# Patient Record
Sex: MALE | Race: OTHER | HISPANIC OR LATINO
[De-identification: names, ages, dates, MRNs, and addresses within clinical notes are randomized per-mention and may not be internally consistent; named-entity substitution may affect disease eponyms.]

---

## 2018-12-21 ENCOUNTER — MOBILE ON CALL (OUTPATIENT)
Age: 63
End: 2018-12-21

## 2018-12-28 ENCOUNTER — RX RENEWAL (OUTPATIENT)
Age: 63
End: 2018-12-28

## 2018-12-28 PROBLEM — Z00.00 ENCOUNTER FOR PREVENTIVE HEALTH EXAMINATION: Status: ACTIVE | Noted: 2018-12-28

## 2019-02-20 ENCOUNTER — RECORD ABSTRACTING (OUTPATIENT)
Age: 64
End: 2019-02-20

## 2019-02-20 DIAGNOSIS — Z78.9 OTHER SPECIFIED HEALTH STATUS: ICD-10-CM

## 2019-02-20 DIAGNOSIS — Z86.69 PERSONAL HISTORY OF OTHER DISEASES OF THE NERVOUS SYSTEM AND SENSE ORGANS: ICD-10-CM

## 2019-02-20 DIAGNOSIS — Z82.49 FAMILY HISTORY OF ISCHEMIC HEART DISEASE AND OTHER DISEASES OF THE CIRCULATORY SYSTEM: ICD-10-CM

## 2019-03-11 PROBLEM — Z82.49 FAMILY HISTORY OF MYOCARDIAL INFARCTION: Status: ACTIVE | Noted: 2019-03-11

## 2019-04-10 ENCOUNTER — NON-APPOINTMENT (OUTPATIENT)
Age: 64
End: 2019-04-10

## 2019-04-10 ENCOUNTER — APPOINTMENT (OUTPATIENT)
Dept: CARDIOLOGY | Facility: CLINIC | Age: 64
End: 2019-04-10
Payer: COMMERCIAL

## 2019-04-10 VITALS
SYSTOLIC BLOOD PRESSURE: 138 MMHG | HEIGHT: 70 IN | WEIGHT: 255 LBS | HEART RATE: 55 BPM | DIASTOLIC BLOOD PRESSURE: 74 MMHG | BODY MASS INDEX: 36.51 KG/M2

## 2019-04-10 PROCEDURE — 99214 OFFICE O/P EST MOD 30 MIN: CPT

## 2019-04-10 PROCEDURE — 93000 ELECTROCARDIOGRAM COMPLETE: CPT

## 2019-04-10 RX ORDER — NITROGLYCERIN 0.4 MG/1
0.4 TABLET SUBLINGUAL
Refills: 0 | Status: ACTIVE | COMMUNITY

## 2019-04-10 NOTE — HISTORY OF PRESENT ILLNESS
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 ([facility]). He has not been hospitalized in the last 6 months. He has been troubled by some heel pain. He denies chest pain, but has "felt a little funny, like the breath is tight". This occurred when walking from the train. no PND, or orthopnea, palpitations or syncope. He is not exercising but walks 10-15 minutes to work daily.

## 2019-04-12 ENCOUNTER — APPOINTMENT (OUTPATIENT)
Dept: PODIATRY | Facility: CLINIC | Age: 64
End: 2019-04-12
Payer: COMMERCIAL

## 2019-04-12 VITALS
WEIGHT: 255 LBS | BODY MASS INDEX: 36.51 KG/M2 | SYSTOLIC BLOOD PRESSURE: 148 MMHG | DIASTOLIC BLOOD PRESSURE: 80 MMHG | HEIGHT: 70 IN

## 2019-04-12 PROCEDURE — 73630 X-RAY EXAM OF FOOT: CPT | Mod: RT

## 2019-04-12 PROCEDURE — 99203 OFFICE O/P NEW LOW 30 MIN: CPT | Mod: 25

## 2019-04-12 NOTE — PROCEDURE
[FreeTextEntry1] : Evaluation of the posterior portion of the heel reveals pain upon palpation of the area of insertion of the Achilles tendon to the posterior aspect of the heel. 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

## 2019-04-12 NOTE — PHYSICAL EXAM
[General Appearance - Alert] : alert [General Appearance - In No Acute Distress] : in no acute distress [Motor Tone] : muscle strength and tone were normal [] : no rash [Skin Turgor] : normal skin turgor [Skin Color & Pigmentation] : normal skin color and pigmentation [Sensation] : the sensory exam was normal to light touch and pinprick [Deep Tendon Reflexes (DTR)] : deep tendon reflexes were 2+ and symmetric [No Focal Deficits] : no focal deficits [Oriented To Time, Place, And Person] : oriented to person, place, and time [Affect] : the affect was normal [Impaired Insight] : insight and judgment were intact [FreeTextEntry1] : There is a mildly antalgic gait present. There are bilateral had been performed and was appreciated at the posterior heel enlargement. Range of motion at the ankle joint and slightly less than 0° bilateral there is no redness or inflammation appreciated no sign of infection and the Achilles tendon is completely intact without any evidence of void. The patient is able to rise up on his toes bilaterally

## 2019-04-12 NOTE — REVIEW OF SYSTEMS
[As Noted in HPI] : as noted in HPI [Negative] : Psychiatric [Arthralgias] : no arthralgias [Joint Pain] : no joint pain [Joint Stiffness] : no joint stiffness [Joint Swelling] : no joint swelling [Limb Swelling] : no limb swelling [Skin Lesions] : no skin lesions [Limb Pain] : no limb pain [de-identified] : pump bump present bilateral [Skin Wound] : no skin wound

## 2019-04-12 NOTE — ADDENDUM
[FreeTextEntry1] : X-ray evaluation of the right foot and heel reveal no evidence of plantar posterior . 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

## 2019-04-12 NOTE — HISTORY OF PRESENT ILLNESS
[FreeTextEntry1] : Location: posterior aspect\par Duration: about 3 weeks\par Acute: yes\par Chronic:\par Past Tx: nothing\par Exacerbated by: walking \par

## 2019-05-06 ENCOUNTER — RESULT REVIEW (OUTPATIENT)
Age: 64
End: 2019-05-06

## 2019-05-09 ENCOUNTER — OTHER (OUTPATIENT)
Age: 64
End: 2019-05-09

## 2019-05-16 ENCOUNTER — APPOINTMENT (OUTPATIENT)
Dept: PODIATRY | Facility: CLINIC | Age: 64
End: 2019-05-16
Payer: COMMERCIAL

## 2019-05-16 VITALS
SYSTOLIC BLOOD PRESSURE: 140 MMHG | WEIGHT: 255 LBS | BODY MASS INDEX: 36.51 KG/M2 | HEIGHT: 70 IN | DIASTOLIC BLOOD PRESSURE: 80 MMHG

## 2019-05-16 PROCEDURE — 99213 OFFICE O/P EST LOW 20 MIN: CPT

## 2019-05-16 NOTE — PROCEDURE
[FreeTextEntry1] : The patient was advised formal physical therapy is critical at this point and that I recommend it in order to try and achieve best possible outcome to their problem. \par A complete and thorough evaluation of the type of shoes they should be wearing and type of shoes for this time of year was discussed with patient.\par \par \par

## 2019-05-16 NOTE — HISTORY OF PRESENT ILLNESS
[FreeTextEntry1] : Location: posterior aspect\par Acute: yes\par Past Tx: nsaids and heel lifts, didn’t go to PT at all\par

## 2019-05-16 NOTE — PHYSICAL EXAM
[General Appearance - Alert] : alert [General Appearance - In No Acute Distress] : in no acute distress [Full Pulse] : the pedal pulses are present [Edema] : there was no peripheral edema [Motor Tone] : muscle strength and tone were normal [Skin Color & Pigmentation] : normal skin color and pigmentation [Skin Turgor] : normal skin turgor [] : no rash [FreeTextEntry1] : Examination shows less pain upon palpation of the posterior aspect of the heel, little pain at the AT, the patient denies as much pain at the end of the day, the pain they currently experience is more in the form of post static dyskinesia. The patient states prior treatments have significantly improved the condition. No additional signs or symptoms regarding pain noted.

## 2019-05-22 ENCOUNTER — OTHER (OUTPATIENT)
Age: 64
End: 2019-05-22

## 2019-05-31 ENCOUNTER — APPOINTMENT (OUTPATIENT)
Dept: CARDIOLOGY | Facility: CLINIC | Age: 64
End: 2019-05-31

## 2019-09-11 ENCOUNTER — RX RENEWAL (OUTPATIENT)
Age: 64
End: 2019-09-11

## 2019-10-21 ENCOUNTER — LABORATORY RESULT (OUTPATIENT)
Age: 64
End: 2019-10-21

## 2019-10-22 ENCOUNTER — APPOINTMENT (OUTPATIENT)
Dept: CARDIOLOGY | Facility: CLINIC | Age: 64
End: 2019-10-22
Payer: COMMERCIAL

## 2019-10-22 VITALS
WEIGHT: 255 LBS | HEIGHT: 70 IN | DIASTOLIC BLOOD PRESSURE: 76 MMHG | SYSTOLIC BLOOD PRESSURE: 124 MMHG | HEART RATE: 60 BPM | BODY MASS INDEX: 36.51 KG/M2

## 2019-10-22 DIAGNOSIS — M92.61 JUVENILE OSTEOCHONDROSIS OF TARSUS, RIGHT ANKLE: ICD-10-CM

## 2019-10-22 DIAGNOSIS — M79.671 PAIN IN RIGHT FOOT: ICD-10-CM

## 2019-10-22 DIAGNOSIS — M76.61 ACHILLES TENDINITIS, RIGHT LEG: ICD-10-CM

## 2019-10-22 PROCEDURE — 90653 IIV ADJUVANT VACCINE IM: CPT

## 2019-10-22 PROCEDURE — 36415 COLL VENOUS BLD VENIPUNCTURE: CPT

## 2019-10-22 PROCEDURE — 90471 IMMUNIZATION ADMIN: CPT

## 2019-10-22 PROCEDURE — 93000 ELECTROCARDIOGRAM COMPLETE: CPT

## 2019-10-22 PROCEDURE — 99214 OFFICE O/P EST MOD 30 MIN: CPT | Mod: 25

## 2019-10-22 RX ORDER — DICLOFENAC 35 MG/1
35 CAPSULE ORAL
Qty: 60 | Refills: 1 | Status: DISCONTINUED | COMMUNITY
Start: 2019-04-12 | End: 2019-10-22

## 2019-10-22 NOTE — CARDIOLOGY SUMMARY
[No Ischemia] : no Ischemia [___] : [unfilled] [LVEF ___%] : LVEF [unfilled]% [Mild] : mild mitral regurgitation [Enlarged] : enlarged LA size

## 2019-10-22 NOTE — REASON FOR VISIT
[Coronary Artery Disease] : coronary artery disease [Follow-Up - Clinic] : a clinic follow-up of [FreeTextEntry2] : -6 month

## 2019-10-22 NOTE — HISTORY OF PRESENT ILLNESS
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 ([facility]). He has not been hospitalized in the last 6 months.\par He has  chest tightness at times- cannot be more specific about the frequency, when he walks especially with his bag across his chest .\par He denies , dyspnea, palpitations or syncope.\par He has chronic dyspnea on exertion.\par He needs to walk 10 minutes to work daily.

## 2019-10-23 ENCOUNTER — MED ADMIN CHARGE (OUTPATIENT)
Age: 64
End: 2019-10-23

## 2019-10-23 ENCOUNTER — RX RENEWAL (OUTPATIENT)
Age: 64
End: 2019-10-23

## 2019-10-23 LAB
ALBUMIN SERPL ELPH-MCNC: 4.8 G/DL
ALP BLD-CCNC: 65 U/L
ALT SERPL-CCNC: 25 U/L
ANION GAP SERPL CALC-SCNC: 15 MMOL/L
AST SERPL-CCNC: 20 U/L
BASOPHILS # BLD AUTO: 0.03 K/UL
BASOPHILS NFR BLD AUTO: 0.5 %
BILIRUB SERPL-MCNC: 0.5 MG/DL
BUN SERPL-MCNC: 17 MG/DL
CALCIUM SERPL-MCNC: 9.9 MG/DL
CHLORIDE SERPL-SCNC: 105 MMOL/L
CHOLEST SERPL-MCNC: 156 MG/DL
CHOLEST/HDLC SERPL: 3.1 RATIO
CO2 SERPL-SCNC: 26 MMOL/L
CREAT SERPL-MCNC: 0.97 MG/DL
EOSINOPHIL # BLD AUTO: 0.09 K/UL
EOSINOPHIL NFR BLD AUTO: 1.6 %
GLUCOSE SERPL-MCNC: 104 MG/DL
HCT VFR BLD CALC: 47.5 %
HDLC SERPL-MCNC: 51 MG/DL
HGB BLD-MCNC: 15.3 G/DL
IMM GRANULOCYTES NFR BLD AUTO: 0.4 %
LDLC SERPL CALC-MCNC: 85 MG/DL
LYMPHOCYTES # BLD AUTO: 1.34 K/UL
LYMPHOCYTES NFR BLD AUTO: 23.7 %
MAN DIFF?: NORMAL
MCHC RBC-ENTMCNC: 31.7 PG
MCHC RBC-ENTMCNC: 32.2 GM/DL
MCV RBC AUTO: 98.3 FL
MONOCYTES # BLD AUTO: 0.49 K/UL
MONOCYTES NFR BLD AUTO: 8.7 %
NEUTROPHILS # BLD AUTO: 3.69 K/UL
NEUTROPHILS NFR BLD AUTO: 65.1 %
PLATELET # BLD AUTO: 181 K/UL
POTASSIUM SERPL-SCNC: 5.1 MMOL/L
PROT SERPL-MCNC: 6.9 G/DL
RBC # BLD: 4.83 M/UL
RBC # FLD: 12.4 %
SODIUM SERPL-SCNC: 146 MMOL/L
TRIGL SERPL-MCNC: 100 MG/DL
WBC # FLD AUTO: 5.66 K/UL

## 2019-11-06 ENCOUNTER — MOBILE ON CALL (OUTPATIENT)
Age: 64
End: 2019-11-06

## 2019-11-07 ENCOUNTER — OTHER (OUTPATIENT)
Age: 64
End: 2019-11-07

## 2019-11-08 VITALS
SYSTOLIC BLOOD PRESSURE: 117 MMHG | TEMPERATURE: 97 F | RESPIRATION RATE: 18 BRPM | OXYGEN SATURATION: 97 % | WEIGHT: 250 LBS | HEIGHT: 70 IN | HEART RATE: 64 BPM | DIASTOLIC BLOOD PRESSURE: 82 MMHG

## 2019-11-08 NOTE — H&P ADULT - HISTORY OF PRESENT ILLNESS
SKELETON    65 yo male, PMHx HTN, Hyperlipidemia, CAD h/o MI in 2003 at [facility] with RCA stent presents to cardiologist, Dr Aguilar, endorsing chest pain when he walks. Denies dyspnea, palpitations, syncope.     CTA coronaries on 11/1/19 LM >50%. Prox/mid LAD severe stenosis. Mid RCA immediately distal to stent with severe stenosis. Distal RCA with severe stenosis. Mid RCA stent unable to assess for ISR. Right dominant system. LCX and OM1 mild luminal narrowing. Confirm Imdur and Zetia doses with patient on arrival    65 yo male, FHX CAD PMHx HTN, Hyperlipidemia, CAD h/o MI in 2003 at [facility] with RCA stent presents to cardiologist, Dr Aguilar, endorsing mild intermittent chest pain with moderate exertion, worsening over the last year. Pt also endorses intermittent episodes of dizziness that are unrelated to the chest pain. Denies syncope, SOB, nausea, orthopnea, LE edema, claudication. CTA coronaries on 11/1/19 LM >50%. Prox/mid LAD severe stenosis. Mid RCA immediately distal to stent with severe stenosis. Distal RCA with severe stenosis. Mid RCA stent unable to assess for ISR. Right dominant system. LCX and OM1 mild luminal narrowing.     In light of pt's risk factors, CCS Class 2 anginal symptoms, and abnormal CTA coronaries pt presents for recommended cardiac catheterization with possible intervention. 65 yo male, FHX CAD PMHx HTN, Hyperlipidemia, CAD h/o MI in 2003 at [facility] with RCA stent presented to cardiologist, Dr Aguilar, endorsing mild intermittent chest pain with moderate exertion, worsening over the last year. Pt also endorses intermittent episodes of dizziness that are unrelated to the chest pain. Denies syncope, SOB, nausea, orthopnea, LE edema, claudication. CTA coronaries on 11/1/19 revealed LM >50%. Prox/mid LAD severe stenosis. Mid RCA immediately distal to stent with severe stenosis. Distal RCA with severe stenosis. Mid RCA stent unable to assess for ISR. Right dominant system. LCX and OM1 mild luminal narrowing.  Nuclear Stress Test (5/9/2019): Did not have chest pain but had RHOADES during exercise and hypertensive response. VPCs including couplets during stress and recovery. Normal Heart rate recovery. Perfusion imaging is consistent with prior interolateral MI with moderate alexandre-infarct ischemia. In addition there mild TI therefore cannot rule out multivessel disease. Reduced thickening of the inferolateral wall, EF 53%.     In light of pt's risk factors, CCS Class 2 anginal symptoms, abnormal CTA coronaries and abnormal NST, pt presents for recommended cardiac catheterization with possible intervention.

## 2019-11-08 NOTE — H&P ADULT - ASSESSMENT
63 yo male, FHX CAD PMHx HTN, Hyperlipidemia, CAD h/o MI in 2003 at [facility] with RCA stent presents for cardiac catheterization with possible intervention if clinically indicated due to CCS Angina Class II Symptoms, patient’s risk factors, abnormal CTA Coronaries and abnormal NST.     -Patient took ASA 81 mg this AM  -NS @ 75 cc/hour pre cath fluids ordered.     Risks & benefits of procedure and alternative therapy have been explained to the patient including but not limited to: allergic reaction, bleeding w/possible need for blood transfusion, infection, renal and vascular compromise, limb damage, arrhythmia, stroke, vessel dissection/perforation, Myocardial infarction, emergent CABG. Informed consent obtained and in chart. 65 yo male, FHX CAD PMHx HTN, Hyperlipidemia, CAD h/o MI in 2003 at [facility] with RCA stent presents for cardiac catheterization with possible intervention if clinically indicated due to CCS Angina Class II Symptoms, patient’s risk factors, abnormal CTA Coronaries and abnormal NST.     -Patient took ASA 81 mg this AM. No Plavix load per Dr. Sharp due to concern for multivessel disease.   -NS @ 75 cc/hour pre cath fluids ordered.     Risks & benefits of procedure and alternative therapy have been explained to the patient including but not limited to: allergic reaction, bleeding w/possible need for blood transfusion, infection, renal and vascular compromise, limb damage, arrhythmia, stroke, vessel dissection/perforation, Myocardial infarction, emergent CABG. Informed consent obtained and in chart.

## 2019-11-08 NOTE — H&P ADULT - NEGATIVE CARDIOVASCULAR SYMPTOMS
no dyspnea on exertion/no claudication/no paroxysmal nocturnal dyspnea/no orthopnea/no peripheral edema

## 2019-11-11 ENCOUNTER — TRANSCRIPTION ENCOUNTER (OUTPATIENT)
Age: 64
End: 2019-11-11

## 2019-11-11 ENCOUNTER — INPATIENT (INPATIENT)
Facility: HOSPITAL | Age: 64
LOS: 0 days | Discharge: HOME | DRG: 247 | End: 2019-11-12
Attending: INTERNAL MEDICINE | Admitting: INTERNAL MEDICINE
Payer: COMMERCIAL

## 2019-11-11 LAB
ALBUMIN SERPL ELPH-MCNC: 4.5 G/DL — SIGNIFICANT CHANGE UP (ref 3.3–5)
ALP SERPL-CCNC: 65 U/L — SIGNIFICANT CHANGE UP (ref 40–120)
ALT FLD-CCNC: 23 U/L — SIGNIFICANT CHANGE UP (ref 10–45)
ANION GAP SERPL CALC-SCNC: 14 MMOL/L — SIGNIFICANT CHANGE UP (ref 5–17)
APTT BLD: 36.8 SEC — HIGH (ref 27.5–36.3)
AST SERPL-CCNC: 21 U/L — SIGNIFICANT CHANGE UP (ref 10–40)
BASOPHILS # BLD AUTO: 0.02 K/UL — SIGNIFICANT CHANGE UP (ref 0–0.2)
BASOPHILS NFR BLD AUTO: 0.3 % — SIGNIFICANT CHANGE UP (ref 0–2)
BILIRUB SERPL-MCNC: 0.7 MG/DL — SIGNIFICANT CHANGE UP (ref 0.2–1.2)
BUN SERPL-MCNC: 16 MG/DL — SIGNIFICANT CHANGE UP (ref 7–23)
CALCIUM SERPL-MCNC: 9.6 MG/DL — SIGNIFICANT CHANGE UP (ref 8.4–10.5)
CHLORIDE SERPL-SCNC: 104 MMOL/L — SIGNIFICANT CHANGE UP (ref 96–108)
CHOLEST SERPL-MCNC: 111 MG/DL — SIGNIFICANT CHANGE UP (ref 10–199)
CK MB CFR SERPL CALC: 3.4 NG/ML — SIGNIFICANT CHANGE UP (ref 0–6.7)
CK SERPL-CCNC: 231 U/L — HIGH (ref 30–200)
CO2 SERPL-SCNC: 24 MMOL/L — SIGNIFICANT CHANGE UP (ref 22–31)
CREAT SERPL-MCNC: 0.94 MG/DL — SIGNIFICANT CHANGE UP (ref 0.5–1.3)
EOSINOPHIL # BLD AUTO: 0.07 K/UL — SIGNIFICANT CHANGE UP (ref 0–0.5)
EOSINOPHIL NFR BLD AUTO: 1 % — SIGNIFICANT CHANGE UP (ref 0–6)
GLUCOSE SERPL-MCNC: 109 MG/DL — HIGH (ref 70–99)
HBA1C BLD-MCNC: 5.7 % — HIGH (ref 4–5.6)
HCT VFR BLD CALC: 42.9 % — SIGNIFICANT CHANGE UP (ref 39–50)
HDLC SERPL-MCNC: 44 MG/DL — SIGNIFICANT CHANGE UP
HGB BLD-MCNC: 14.8 G/DL — SIGNIFICANT CHANGE UP (ref 13–17)
IMM GRANULOCYTES NFR BLD AUTO: 0.3 % — SIGNIFICANT CHANGE UP (ref 0–1.5)
INR BLD: 1.04 — SIGNIFICANT CHANGE UP (ref 0.88–1.16)
LIPID PNL WITH DIRECT LDL SERPL: 51 MG/DL — SIGNIFICANT CHANGE UP
LYMPHOCYTES # BLD AUTO: 1.35 K/UL — SIGNIFICANT CHANGE UP (ref 1–3.3)
LYMPHOCYTES # BLD AUTO: 18.9 % — SIGNIFICANT CHANGE UP (ref 13–44)
MCHC RBC-ENTMCNC: 31.9 PG — SIGNIFICANT CHANGE UP (ref 27–34)
MCHC RBC-ENTMCNC: 34.5 GM/DL — SIGNIFICANT CHANGE UP (ref 32–36)
MCV RBC AUTO: 92.5 FL — SIGNIFICANT CHANGE UP (ref 80–100)
MONOCYTES # BLD AUTO: 0.69 K/UL — SIGNIFICANT CHANGE UP (ref 0–0.9)
MONOCYTES NFR BLD AUTO: 9.6 % — SIGNIFICANT CHANGE UP (ref 2–14)
NEUTROPHILS # BLD AUTO: 5.01 K/UL — SIGNIFICANT CHANGE UP (ref 1.8–7.4)
NEUTROPHILS NFR BLD AUTO: 69.9 % — SIGNIFICANT CHANGE UP (ref 43–77)
NRBC # BLD: 0 /100 WBCS — SIGNIFICANT CHANGE UP (ref 0–0)
PLATELET # BLD AUTO: 179 K/UL — SIGNIFICANT CHANGE UP (ref 150–400)
POTASSIUM SERPL-MCNC: 4.2 MMOL/L — SIGNIFICANT CHANGE UP (ref 3.5–5.3)
POTASSIUM SERPL-SCNC: 4.2 MMOL/L — SIGNIFICANT CHANGE UP (ref 3.5–5.3)
PROT SERPL-MCNC: 7.6 G/DL — SIGNIFICANT CHANGE UP (ref 6–8.3)
PROTHROM AB SERPL-ACNC: 11.8 SEC — SIGNIFICANT CHANGE UP (ref 10–12.9)
RBC # BLD: 4.64 M/UL — SIGNIFICANT CHANGE UP (ref 4.2–5.8)
RBC # FLD: 11.9 % — SIGNIFICANT CHANGE UP (ref 10.3–14.5)
SODIUM SERPL-SCNC: 142 MMOL/L — SIGNIFICANT CHANGE UP (ref 135–145)
TOTAL CHOLESTEROL/HDL RATIO MEASUREMENT: 2.5 RATIO — LOW (ref 3.4–9.6)
TRIGL SERPL-MCNC: 80 MG/DL — SIGNIFICANT CHANGE UP (ref 10–149)
WBC # BLD: 7.16 K/UL — SIGNIFICANT CHANGE UP (ref 3.8–10.5)
WBC # FLD AUTO: 7.16 K/UL — SIGNIFICANT CHANGE UP (ref 3.8–10.5)

## 2019-11-11 PROCEDURE — 93571 IV DOP VEL&/PRESS C FLO 1ST: CPT | Mod: 26,LD

## 2019-11-11 PROCEDURE — 92928 PRQ TCAT PLMT NTRAC ST 1 LES: CPT | Mod: LD

## 2019-11-11 PROCEDURE — 93306 TTE W/DOPPLER COMPLETE: CPT | Mod: 26

## 2019-11-11 PROCEDURE — 99291 CRITICAL CARE FIRST HOUR: CPT

## 2019-11-11 PROCEDURE — 93572 IV DOP VEL&/PRESS C FLO EA: CPT | Mod: 26,RC

## 2019-11-11 PROCEDURE — 93010 ELECTROCARDIOGRAM REPORT: CPT

## 2019-11-11 PROCEDURE — 93458 L HRT ARTERY/VENTRICLE ANGIO: CPT | Mod: 26,59

## 2019-11-11 RX ORDER — ENOXAPARIN SODIUM 100 MG/ML
40 INJECTION SUBCUTANEOUS EVERY 24 HOURS
Refills: 0 | Status: DISCONTINUED | OUTPATIENT
Start: 2019-11-11 | End: 2019-11-12

## 2019-11-11 RX ORDER — CLOPIDOGREL BISULFATE 75 MG/1
75 TABLET, FILM COATED ORAL DAILY
Refills: 0 | Status: DISCONTINUED | OUTPATIENT
Start: 2019-11-12 | End: 2019-11-12

## 2019-11-11 RX ORDER — ISOSORBIDE MONONITRATE 60 MG/1
30 TABLET, EXTENDED RELEASE ORAL DAILY
Refills: 0 | Status: DISCONTINUED | OUTPATIENT
Start: 2019-11-11 | End: 2019-11-12

## 2019-11-11 RX ORDER — ATORVASTATIN CALCIUM 80 MG/1
80 TABLET, FILM COATED ORAL DAILY
Refills: 0 | Status: DISCONTINUED | OUTPATIENT
Start: 2019-11-11 | End: 2019-11-12

## 2019-11-11 RX ORDER — ONDANSETRON 8 MG/1
4 TABLET, FILM COATED ORAL ONCE
Refills: 0 | Status: COMPLETED | OUTPATIENT
Start: 2019-11-11 | End: 2019-11-11

## 2019-11-11 RX ORDER — LISINOPRIL 2.5 MG/1
40 TABLET ORAL DAILY
Refills: 0 | Status: DISCONTINUED | OUTPATIENT
Start: 2019-11-12 | End: 2019-11-12

## 2019-11-11 RX ORDER — SODIUM CHLORIDE 9 MG/ML
500 INJECTION INTRAMUSCULAR; INTRAVENOUS; SUBCUTANEOUS ONCE
Refills: 0 | Status: COMPLETED | OUTPATIENT
Start: 2019-11-11 | End: 2019-11-11

## 2019-11-11 RX ORDER — SODIUM CHLORIDE 9 MG/ML
500 INJECTION INTRAMUSCULAR; INTRAVENOUS; SUBCUTANEOUS
Refills: 0 | Status: DISCONTINUED | OUTPATIENT
Start: 2019-11-11 | End: 2019-11-11

## 2019-11-11 RX ORDER — AMLODIPINE BESYLATE 2.5 MG/1
5 TABLET ORAL DAILY
Refills: 0 | Status: DISCONTINUED | OUTPATIENT
Start: 2019-11-11 | End: 2019-11-12

## 2019-11-11 RX ORDER — CHLORHEXIDINE GLUCONATE 213 G/1000ML
1 SOLUTION TOPICAL
Refills: 0 | Status: DISCONTINUED | OUTPATIENT
Start: 2019-11-11 | End: 2019-11-12

## 2019-11-11 RX ORDER — ASPIRIN/CALCIUM CARB/MAGNESIUM 324 MG
81 TABLET ORAL DAILY
Refills: 0 | Status: DISCONTINUED | OUTPATIENT
Start: 2019-11-12 | End: 2019-11-12

## 2019-11-11 RX ADMIN — SODIUM CHLORIDE 1801.8 MILLILITER(S): 9 INJECTION INTRAMUSCULAR; INTRAVENOUS; SUBCUTANEOUS at 12:23

## 2019-11-11 RX ADMIN — ENOXAPARIN SODIUM 40 MILLIGRAM(S): 100 INJECTION SUBCUTANEOUS at 18:22

## 2019-11-11 RX ADMIN — ONDANSETRON 4 MILLIGRAM(S): 8 TABLET, FILM COATED ORAL at 11:56

## 2019-11-11 RX ADMIN — SODIUM CHLORIDE 75 MILLILITER(S): 9 INJECTION INTRAMUSCULAR; INTRAVENOUS; SUBCUTANEOUS at 08:00

## 2019-11-11 NOTE — PROGRESS NOTE ADULT - ATTENDING COMMENTS
65 y/o man with a PMHx of CAD s/p RCA stent at [facility], MI in 2003 at [facility], HTN, HLD  who presented for recommended cardiac catheterization with possible intervention due to CCS Class 2 anginal symptoms, abnormal CTA coronaries and abnormal NST. Pt s/p cardiac cath on 11/11 FFR/rotational artherectomy/PTCA/NAKITA mLAD; EF 50%;  Complicated by an episode of bradycardia s/p 1x 0.5mg IV atropine and 1L NS and an episode of SOB and nausea. BP 93/60, HR 61; EKG without changes. Echo showing no R side collapse, mod-moderate MR. Stepped up to CCU for close monitoring.    CARDIOVASCULAR  #CAD  - with hx of MI in 2003 at [facility], s/p RCA stent  - sees Cardiologist Dr. Aguilar, with mild intermittent CP with moderate exertion that progressively worsened in the past year  - 11/1/19 CTA coronaries showed LM>50%, p/mid-LAD with severe stenosis; mid-RCA immediately distal to stent with severe stenosis. Distal RCA with severe stenosis. Mid RCA stent unable to assess for ISR. Right dominant system. LCX and OM1 mild luminal narrowing.  - 5/9/19 Nuclear stress test: Did not have chest pain but had RHOADES during exercise and hypertensive response. VPCs including couplets during stress and recovery. Normal Heart rate recovery. Perfusion imaging is consistent with prior interolateral MI with moderate alexandre-infarct ischemia. In addition there mild TI therefore cannot rule out multivessel disease. Reduced thickening of the inferolateral wall, EF 53%.   - in light of pt's risk factors, CCS Class 2 anginal symptoms, abnormal CTA coronaries and abnormal NST, pt presents for recommended cardiac catheterization with possible intervention  - cardiac cath on 11/11 FFR/rotational artherectomy/PTCA/NAKITA mLAD; EF 50%; RCA patent, significant Lmain with LAD disease, RCA with FFR>0.84  - pt loaded with ASA and plavix in cath lab  - pt with mild SOB that improves with position, now self-resolved  - transferred to CCU for further monitoring  - c/w ASA 81mg qd, plavix 75mg, and atorvastatin 80mg qd    #Bradycardia  - Pt had an episode of bradycardia with 2-3 sec pauses prior to atherectomy, resolved s/p IV 0.5mg atropine x1  - continue to monitor on tele    #HTN  Pt with hx of HTN  - BP normotensive here with sBP 110s-120s  - c/w lisinopril 40mg, amlodipine 5mg, imdur 30mg qd    #HLD  Pt has hx of HLD  - c/w atorvastatin 80mg po qd    PULMONARY  #SOB  - pt had an episode of SOB that improves with sitting up and laying down  - BP 93/60, HR 61; EKG without changes  - Echo showing no R side collapse, mod-moderate MR  - now self resolved  - continue to monitor in the CCU        I have personally provided 30 minutes of critical care time concurrently with the resident and  fellow.  I have reviewed the resident’s documentation and I agree with the resident’s assessment and plan of care.  EULALIO Santiago.

## 2019-11-11 NOTE — PROGRESS NOTE ADULT - ASSESSMENT
65 y/o man with a PMHx of CAD s/p RCA stent at [place], [place] in 2003 at [facility], HTN, HLD  who presented for recommended cardiac catheterization with possible intervention due to CCS Class 2 anginal symptoms, abnormal CTA coronaries and abnormal NST. Pt s/p cardiac cath on 11/11 FFR/rotational artherectomy/PTCA/NAKITA mLAD; EF 50%;  Complicated by an episode of bradycardia s/p 1x 0.5mg IV atropine and 1L NS and an episode of SOB and nausea. BP 93/60, HR 61; EKG without changes. STAT Echo showing no R side collapse, mod-moderate MR. Stepped up to CCU for close monitoring.    CARDIOVASCULAR  #CAD    - c/w ASA 81mg qd, plavix 75mg, and atorvastatin 80mg qd    #HTN  Pt with hx of HTN  - c/w lisinopril 40mg po qd and amlodipine 5mg po qd    #HLD  Pt has hx of HLD  - c/w atorvastatin 80mg po qd 65 y/o man with a PMHx of CAD s/p RCA stent at [facility], [place] in 2003 at [facility], HTN, HLD  who presented for recommended cardiac catheterization with possible intervention due to CCS Class 2 anginal symptoms, abnormal CTA coronaries and abnormal NST. Pt s/p cardiac cath on 11/11 FFR/rotational artherectomy/PTCA/NAKITA mLAD; EF 50%;  Complicated by an episode of bradycardia s/p 1x 0.5mg IV atropine and 1L NS and an episode of SOB and nausea. BP 93/60, HR 61; EKG without changes. STAT Echo showing no R side collapse, mod-moderate MR. Stepped up to CCU for close monitoring.    CARDIOVASCULAR  #CAD  - with hx of MI in 2003 at [facility], s/p RCA stent  - sees Cardiologist Dr. Aguilar, with mild intermittent CP with moderate exertion that progressively worsened in the past year  - 11/1/19 CTA coronaries showed LM>50%, p/mid-LAD with severe stenosis; mid-RCA immediately distal to stent with severe stenosis. Distal RCA with severe stenosis. Mid RCA stent unable to assess for ISR. Right dominant system. LCX and OM1 mild luminal narrowing.  - 5/9/19 Nuclear stress test: Did not have chest pain but had RHOADES during exercise and hypertensive response. VPCs including couplets during stress and recovery. Normal Heart rate recovery. Perfusion imaging is consistent with prior interolateral MI with moderate alexandre-infarct ischemia. In addition there mild TI therefore cannot rule out multivessel disease. Reduced thickening of the inferolateral wall, EF 53%.   - in light of pt's risk factors, CCS Class 2 anginal symptoms, abnormal CTA coronaries and abnormal NST, pt presents for recommended cardiac catheterization with possible intervention  - cardiac cath on 11/11 FFR/rotational artherectomy/PTCA/NAKITA mLAD; EF 50%; RCA patent, significant Lmain with LAD disease, RCA with FFR>0.84  - pt loaded with ASA and plavix in cath lab  - pt with mild SOB that changes with position that self-resolved  - transferred to CCU for further monitoring  - c/w ASA 81mg qd, plavix 75mg, and atorvastatin 80mg qd    #Bradycardia  - Pt had an episode of bradycardia with 2-3 sec pauses prior to atherectomy, resolved after IV 0.5mg atropine x1  - continue to monitor on tele    #HTN  Pt with hx of HTN  - BP normotensive here  - c/w lisinopril 40mg po qd and amlodipine 5mg po qd    #HLD  Pt has hx of HLD  - c/w atorvastatin 80mg po qd 65 y/o man with a PMHx of CAD s/p RCA stent at [facility], [place] in 2003 at [facility], HTN, HLD  who presented for recommended cardiac catheterization with possible intervention due to CCS Class 2 anginal symptoms, abnormal CTA coronaries and abnormal NST. Pt s/p cardiac cath on 11/11 FFR/rotational artherectomy/PTCA/NAKITA mLAD; EF 50%;  Complicated by an episode of bradycardia s/p 1x 0.5mg IV atropine and 1L NS and an episode of SOB and nausea. BP 93/60, HR 61; EKG without changes. Bedside echo showing no R side collapse, mod-moderate MR. Stepped up to CCU for close monitoring.    CARDIOVASCULAR  #CAD  - with hx of MI in 2003 at [facility], s/p RCA stent  - sees Cardiologist Dr. Aguilar, with mild intermittent CP with moderate exertion that progressively worsened in the past year  - 11/1/19 CTA coronaries showed LM>50%, p/mid-LAD with severe stenosis; mid-RCA immediately distal to stent with severe stenosis. Distal RCA with severe stenosis. Mid RCA stent unable to assess for ISR. Right dominant system. LCX and OM1 mild luminal narrowing.  - 5/9/19 Nuclear stress test: Did not have chest pain but had RHOADES during exercise and hypertensive response. VPCs including couplets during stress and recovery. Normal Heart rate recovery. Perfusion imaging is consistent with prior interolateral MI with moderate alexandre-infarct ischemia. In addition there mild TI therefore cannot rule out multivessel disease. Reduced thickening of the inferolateral wall, EF 53%.   - in light of pt's risk factors, CCS Class 2 anginal symptoms, abnormal CTA coronaries and abnormal NST, pt presents for recommended cardiac catheterization with possible intervention  - cardiac cath on 11/11 FFR/rotational artherectomy/PTCA/NAKITA mLAD; EF 50%; RCA patent, significant Lmain with LAD disease, RCA with FFR>0.84  - pt loaded with ASA and plavix in cath lab  - pt with mild SOB that changes with position that self-resolved  - transferred to CCU for further monitoring  - c/w ASA 81mg qd, plavix 75mg, and atorvastatin 80mg qd    #Bradycardia  - Pt had an episode of bradycardia with 2-3 sec pauses prior to atherectomy, resolved s/p IV 0.5mg atropine x1  - continue to monitor on tele    #HTN  Pt with hx of HTN  - BP normotensive here with sBP 110s-120s  - c/w lisinopril 40mg, amlodipine 5mg, imdur 30mg qd    #HLD  Pt has hx of HLD  - c/w atorvastatin 80mg po qd    PULMONARY  #SOB  - pt had an episode of SOB that improves with sitting up and laying down  - BP 93/60, HR 61; EKG without changes  - Bedside showing no R side collapse, mod-moderate MR  - self resolved  - continue to monitor in the CCU    Prophylaxis  F: none  E: replete K<4, Mg<2  N: DASH/TLC  VTE Prophylaxis: hep subQ, SCDs  C: Full Code  D: CCU 63 y/o man with a PMHx of CAD s/p RCA stent at [facility], [place] in 2003 at [facility], HTN, HLD  who presented for recommended cardiac catheterization with possible intervention due to CCS Class 2 anginal symptoms, abnormal CTA coronaries and abnormal NST. Pt s/p cardiac cath on 11/11 FFR/rotational artherectomy/PTCA/NAKITA mLAD; EF 50%;  Complicated by an episode of bradycardia s/p 1x 0.5mg IV atropine and 1L NS and an episode of SOB and nausea. BP 93/60, HR 61; EKG without changes. Echo showing no R side collapse, mod-moderate MR. Stepped up to CCU for close monitoring.    CARDIOVASCULAR  #CAD  - with hx of MI in 2003 at [facility], s/p RCA stent  - sees Cardiologist Dr. Aguilar, with mild intermittent CP with moderate exertion that progressively worsened in the past year  - 11/1/19 CTA coronaries showed LM>50%, p/mid-LAD with severe stenosis; mid-RCA immediately distal to stent with severe stenosis. Distal RCA with severe stenosis. Mid RCA stent unable to assess for ISR. Right dominant system. LCX and OM1 mild luminal narrowing.  - 5/9/19 Nuclear stress test: Did not have chest pain but had RHOADES during exercise and hypertensive response. VPCs including couplets during stress and recovery. Normal Heart rate recovery. Perfusion imaging is consistent with prior interolateral MI with moderate alexandre-infarct ischemia. In addition there mild TI therefore cannot rule out multivessel disease. Reduced thickening of the inferolateral wall, EF 53%.   - in light of pt's risk factors, CCS Class 2 anginal symptoms, abnormal CTA coronaries and abnormal NST, pt presents for recommended cardiac catheterization with possible intervention  - cardiac cath on 11/11 FFR/rotational artherectomy/PTCA/NAKITA mLAD; EF 50%; RCA patent, significant Lmain with LAD disease, RCA with FFR>0.84  - pt loaded with ASA and plavix in cath lab  - pt with mild SOB that changes with position that self-resolved  - transferred to CCU for further monitoring  - c/w ASA 81mg qd, plavix 75mg, and atorvastatin 80mg qd    #Bradycardia  - Pt had an episode of bradycardia with 2-3 sec pauses prior to atherectomy, resolved s/p IV 0.5mg atropine x1  - continue to monitor on tele    #HTN  Pt with hx of HTN  - BP normotensive here with sBP 110s-120s  - c/w lisinopril 40mg, amlodipine 5mg, imdur 30mg qd    #HLD  Pt has hx of HLD  - c/w atorvastatin 80mg po qd    PULMONARY  #SOB  - pt had an episode of SOB that improves with sitting up and laying down  - BP 93/60, HR 61; EKG without changes  - Echo showing no R side collapse, mod-moderate MR  - self resolved  - continue to monitor in the CCU    Prophylaxis  F: none  E: replete K<4, Mg<2  N: DASH/TLC  VTE Prophylaxis: hep subQ, SCDs  C: Full Code  D: CCU 65 y/o man with a PMHx of CAD s/p RCA stent at [facility], [place] in 2003 at [facility], HTN, HLD  who presented for recommended cardiac catheterization with possible intervention due to CCS Class 2 anginal symptoms, abnormal CTA coronaries and abnormal NST. Pt s/p cardiac cath on 11/11 FFR/rotational artherectomy/PTCA/NAKITA mLAD; EF 50%;  Complicated by an episode of bradycardia s/p 1x 0.5mg IV atropine and 1L NS and an episode of SOB and nausea. BP 93/60, HR 61; EKG without changes. Echo showing no R side collapse, mod-moderate MR. Stepped up to CCU for close monitoring.    CARDIOVASCULAR  #CAD  - with hx of MI in 2003 at [facility], s/p RCA stent  - sees Cardiologist Dr. Aguilar, with mild intermittent CP with moderate exertion that progressively worsened in the past year  - 11/1/19 CTA coronaries showed LM>50%, p/mid-LAD with severe stenosis; mid-RCA immediately distal to stent with severe stenosis. Distal RCA with severe stenosis. Mid RCA stent unable to assess for ISR. Right dominant system. LCX and OM1 mild luminal narrowing.  - 5/9/19 Nuclear stress test: Did not have chest pain but had RHOADES during exercise and hypertensive response. VPCs including couplets during stress and recovery. Normal Heart rate recovery. Perfusion imaging is consistent with prior interolateral MI with moderate alexandre-infarct ischemia. In addition there mild TI therefore cannot rule out multivessel disease. Reduced thickening of the inferolateral wall, EF 53%.   - in light of pt's risk factors, CCS Class 2 anginal symptoms, abnormal CTA coronaries and abnormal NST, pt presents for recommended cardiac catheterization with possible intervention  - cardiac cath on 11/11 FFR/rotational artherectomy/PTCA/NAKITA mLAD; EF 50%; RCA patent, significant Lmain with LAD disease, RCA with FFR>0.84  - pt loaded with ASA and plavix in cath lab  - pt with mild SOB that improves with position, now self-resolved  - transferred to CCU for further monitoring  - c/w ASA 81mg qd, plavix 75mg, and atorvastatin 80mg qd    #Bradycardia  - Pt had an episode of bradycardia with 2-3 sec pauses prior to atherectomy, resolved s/p IV 0.5mg atropine x1  - continue to monitor on tele    #HTN  Pt with hx of HTN  - BP normotensive here with sBP 110s-120s  - c/w lisinopril 40mg, amlodipine 5mg, imdur 30mg qd    #HLD  Pt has hx of HLD  - c/w atorvastatin 80mg po qd    PULMONARY  #SOB  - pt had an episode of SOB that improves with sitting up and laying down  - BP 93/60, HR 61; EKG without changes  - Echo showing no R side collapse, mod-moderate MR  - now self resolved  - continue to monitor in the CCU    Prophylaxis  F: none  E: replete K<4, Mg<2  N: DASH/TLC  VTE Prophylaxis: lovenox subQ, SCDs  C: Full Code  D: CCU 63 y/o man with a PMHx of CAD s/p RCA stent at [facility], [place] in 2003 at [facility], HTN, HLD who presented for recommended cardiac catheterization with possible intervention due to CCS Class 2 anginal symptoms, abnormal CTA coronaries and abnormal NST. Pt s/p cardiac cath on 11/11 FFR/rotational artherectomy/PTCA/NAKITA mLAD; EF 50%;  Complicated by an episode of bradycardia s/p 1x 0.5mg IV atropine and 1L NS and an episode of SOB and nausea. BP 93/60, HR 61; EKG without changes. Echo showing no R side collapse, mod-moderate MR. Stepped up to CCU for close monitoring.    CARDIOVASCULAR  #CAD  - with hx of MI in 2003 at [facility], s/p RCA stent  - sees Cardiologist Dr. Aguilar, with mild intermittent CP with moderate exertion that progressively worsened in the past year  - 11/1/19 CTA coronaries showed LM>50%, p/mid-LAD with severe stenosis; mid-RCA immediately distal to stent with severe stenosis. Distal RCA with severe stenosis. Mid RCA stent unable to assess for ISR. Right dominant system. LCX and OM1 mild luminal narrowing.  - 5/9/19 Nuclear stress test: Did not have chest pain but had RHOADES during exercise and hypertensive response. VPCs including couplets during stress and recovery. Normal Heart rate recovery. Perfusion imaging is consistent with prior interolateral MI with moderate alexandre-infarct ischemia. In addition there mild TI therefore cannot rule out multivessel disease. Reduced thickening of the inferolateral wall, EF 53%.   - in light of pt's risk factors, CCS Class 2 anginal symptoms, abnormal CTA coronaries and abnormal NST, pt presents for recommended cardiac catheterization with possible intervention  - cardiac cath on 11/11 FFR/rotational artherectomy/PTCA/NAKITA mLAD; EF 50%; RCA patent, significant Lmain with LAD disease, RCA with FFR>0.84  - pt loaded with ASA and plavix in cath lab  - pt with mild SOB that improves with position, now self-resolved  - transferred to CCU for further monitoring  - c/w ASA 81mg qd, plavix 75mg, and atorvastatin 80mg qd    #Bradycardia  - Pt had an episode of bradycardia with 2-3 sec pauses prior to atherectomy, resolved s/p IV 0.5mg atropine x1  - continue to monitor on tele    #HTN  Pt with hx of HTN  - BP normotensive here with sBP 110s-120s  - c/w lisinopril 40mg, amlodipine 5mg, imdur 30mg qd    #HLD  Pt has hx of HLD  - c/w atorvastatin 80mg po qd    PULMONARY  #SOB  - pt had an episode of SOB that improves with sitting up and laying down  - BP 93/60, HR 61; EKG without changes  - Echo showing no R side collapse, mod-moderate MR  - now self resolved  - continue to monitor in the CCU    Prophylaxis  F: none  E: replete K<4, Mg<2  N: DASH/TLC  VTE Prophylaxis: lovenox subQ, SCDs  C: Full Code  D: CCU

## 2019-11-11 NOTE — PATIENT PROFILE ADULT - NSPROEDAREADYLEARN_GEN_A_NUR
- Pain control: now off Dilaudid PCA. Continue Valium ATC, Toradol as needed, Oxycodone as needed.  - IVF, regular diet.  - Bowel regimen: Senna, colace PRN - consider changing to standing or adding Miralax if still not stooling.  - Urine output: s/p straight cath this morning, continue to monitor UOP closely.  - PT - Pain control: now off Dilaudid PCA. Continue Valium ATC, Toradol as needed, Oxycodone as needed.  - IVF, regular diet.  - Bowel regimen: Senna, colace PRN - consider changing to standing or adding Miralax if still not stooling.  - Urine output: s/p straight cath this morning, continue to monitor UOP closely. should repeat cath if no void in 6-8 hrs   - PT none

## 2019-11-11 NOTE — PROGRESS NOTE ADULT - SUBJECTIVE AND OBJECTIVE BOX
11UR TO 5EA TRANSFER ACCEPTANCE NOTE    HOSPITAL COURSE:  63 y/o man with a PMHx of CAD s/p RCA stent at [place], [place] in 2003 at [facility], HTN, HLD  who presented for recommended cardiac catheterization with possible intervention due to CCS Class 2 anginal symptoms, abnormal CTA coronaries and abnormal NST. Pt s/p cardiac cath on 11/11 FFR/rotational artherectomy/PTCA/NAKITA mLAD; EF 50%;  Complicated by an episode of bradycardia s/p 1x 0.5mg IV atropine and 1L NS. Pt also had an episode of SOB and nausea, that improves with sitting up and laying down; BP 93/60, HR 61; EKG without changes. STAT Echo showing no R side collapse, mod-moderate MR. Stepped up to CCU for close monitoring. 11UR TO 5EA TRANSFER ACCEPTANCE NOTE    HOSPITAL COURSE:  65 y/o man with a PMHx of CAD s/p RCA stent at [place], [place] in 2003 at [facility], HTN, HLD  who presented for recommended cardiac catheterization with possible intervention due to CCS Class 2 anginal symptoms, abnormal CTA coronaries and abnormal NST. Pt s/p cardiac cath on 11/11 FFR/rotational artherectomy/PTCA/NAKITA mLAD; EF 50%;  Complicated by an episode of bradycardia s/p 1x 0.5mg IV atropine and 1L NS. Pt also had an episode of SOB and nausea, that improves with sitting up and laying down; BP 93/60, HR 61; EKG without changes. STAT Echo showing no R side collapse, mod-moderate MR. Stepped up to CCU for close monitoring. 11UR TO 5EA TRANSFER ACCEPTANCE NOTE    HOSPITAL COURSE:  65 y/o man with a PMHx of CAD s/p RCA stent at [place], [place] in 2003 at [facility], HTN, HLD  who presented for recommended cardiac catheterization with possible intervention due to CCS Class 2 anginal symptoms, abnormal CTA coronaries and abnormal NST. Pt s/p cardiac cath on 11/11 FFR/rotational artherectomy/PTCA/NAKITA mLAD; EF 50%;  Complicated by an episode of bradycardia s/p 1x 0.5mg IV atropine and 1L NS. Pt also had an episode of SOB and nausea, that improves with sitting up and laying down; BP 93/60, HR 61; EKG without changes. Bedside echo showing no R side collapse, mod-moderate MR. Stepped up to CCU for close monitoring.    OVERNIGHT EVENTS:    SUBJECTIVE/INTERVAL HPI: Patient was seen and examined at bedside.    VITALS  Vital Signs Last 24 Hrs  T(C): --  T(F): --  HR: 76 (11 Nov 2019 15:35) (70 - 76)  BP: 118/66 (11 Nov 2019 15:35) (118/66 - 126/67)  BP(mean): 84 (11 Nov 2019 15:35) (82 - 93)  RR: 27 (11 Nov 2019 15:35) (19 - 29)  SpO2: 93% (11 Nov 2019 15:35) (93% - 96%)      CAPILLARY BLOOD GLUCOSE      PHYSICAL EXAM      MEDICATIONS  (STANDING):  amLODIPine   Tablet 5 milliGRAM(s) Oral daily  atorvastatin 80 milliGRAM(s) Oral daily  chlorhexidine 2% Cloths 1 Application(s) Topical <User Schedule>  isosorbide   mononitrate ER Tablet (IMDUR) 30 milliGRAM(s) Oral daily  sodium chloride 0.9%. 500 milliLiter(s) (75 mL/Hr) IV Continuous <Continuous>      LABS                        14.8   7.16  )-----------( 179      ( 11 Nov 2019 07:14 )             42.9     11-11    142  |  104  |  16  ----------------------------<  109<H>  4.2   |  24  |  0.94    Ca    9.6      11 Nov 2019 07:14    TPro  7.6  /  Alb  4.5  /  TBili  0.7  /  DBili  x   /  AST  21  /  ALT  23  /  AlkPhos  65  11-11    LIVER FUNCTIONS - ( 11 Nov 2019 07:14 )  Alb: 4.5 g/dL / Pro: 7.6 g/dL / ALK PHOS: 65 U/L / ALT: 23 U/L / AST: 21 U/L / GGT: x           PT/INR - ( 11 Nov 2019 07:14 )   PT: 11.8 sec;   INR: 1.04          PTT - ( 11 Nov 2019 07:14 )  PTT:36.8 sec    CARDIAC MARKERS ( 11 Nov 2019 07:14 )  x     / x     / 231 U/L / x     / 3.4 ng/mL        Radiology and other tests: Reviewed 11UR TO 5EA TRANSFER ACCEPTANCE NOTE    HOSPITAL COURSE:  63 y/o man with a PMHx of CAD s/p RCA stent at [place], [place] in 2003 at [facility], HTN, HLD  who presented for recommended cardiac catheterization with possible intervention due to CCS Class 2 anginal symptoms, abnormal CTA coronaries and abnormal NST. Pt s/p cardiac cath on 11/11 FFR/rotational artherectomy/PTCA/NAKITA mLAD; EF 50%;  Complicated by an episode of bradycardia s/p 1x 0.5mg IV atropine and 1L NS. Pt also had an episode of SOB and nausea, that improves with sitting up and laying down; BP 93/60, HR 61; EKG without changes. Echo showing no R side collapse, mod-moderate MR. Stepped up to CCU for close monitoring.    OVERNIGHT EVENTS:    SUBJECTIVE/INTERVAL HPI: Patient was seen and examined at bedside.    VITALS  Vital Signs Last 24 Hrs  T(C): --  T(F): --  HR: 76 (11 Nov 2019 15:35) (70 - 76)  BP: 118/66 (11 Nov 2019 15:35) (118/66 - 126/67)  BP(mean): 84 (11 Nov 2019 15:35) (82 - 93)  RR: 27 (11 Nov 2019 15:35) (19 - 29)  SpO2: 93% (11 Nov 2019 15:35) (93% - 96%)      CAPILLARY BLOOD GLUCOSE      PHYSICAL EXAM      MEDICATIONS  (STANDING):  amLODIPine   Tablet 5 milliGRAM(s) Oral daily  atorvastatin 80 milliGRAM(s) Oral daily  chlorhexidine 2% Cloths 1 Application(s) Topical <User Schedule>  isosorbide   mononitrate ER Tablet (IMDUR) 30 milliGRAM(s) Oral daily  sodium chloride 0.9%. 500 milliLiter(s) (75 mL/Hr) IV Continuous <Continuous>      LABS                        14.8   7.16  )-----------( 179      ( 11 Nov 2019 07:14 )             42.9     11-11    142  |  104  |  16  ----------------------------<  109<H>  4.2   |  24  |  0.94    Ca    9.6      11 Nov 2019 07:14    TPro  7.6  /  Alb  4.5  /  TBili  0.7  /  DBili  x   /  AST  21  /  ALT  23  /  AlkPhos  65  11-11    LIVER FUNCTIONS - ( 11 Nov 2019 07:14 )  Alb: 4.5 g/dL / Pro: 7.6 g/dL / ALK PHOS: 65 U/L / ALT: 23 U/L / AST: 21 U/L / GGT: x           PT/INR - ( 11 Nov 2019 07:14 )   PT: 11.8 sec;   INR: 1.04          PTT - ( 11 Nov 2019 07:14 )  PTT:36.8 sec    CARDIAC MARKERS ( 11 Nov 2019 07:14 )  x     / x     / 231 U/L / x     / 3.4 ng/mL        Radiology and other tests: Reviewed 11UR TO 5EA TRANSFER ACCEPTANCE NOTE    HOSPITAL COURSE:  65 y/o man with a PMHx of CAD s/p RCA stent at [place], [place] in 2003 at [facility], HTN, HLD  who presented for recommended cardiac catheterization with possible intervention due to CCS Class 2 anginal symptoms, abnormal CTA coronaries and abnormal NST. Pt s/p cardiac cath on 11/11 FFR/rotational artherectomy/PTCA/NAKITA mLAD; EF 50%;  Complicated by an episode of bradycardia s/p 1x 0.5mg IV atropine and 1L NS. Pt also had an episode of SOB and nausea, that improves with sitting up and laying down; BP 93/60, HR 61; EKG without changes. Echo showing no R side collapse, mod-moderate MR. Stepped up to CCU for close monitoring.    SUBJECTIVE/INTERVAL HPI: Patient was seen and examined at bedside upon arrival at CCU.    VITALS  Vital Signs Last 24 Hrs  T(C): --  T(F): --  HR: 76 (11 Nov 2019 15:35) (70 - 76)  BP: 118/66 (11 Nov 2019 15:35) (118/66 - 126/67)  BP(mean): 84 (11 Nov 2019 15:35) (82 - 93)  RR: 27 (11 Nov 2019 15:35) (19 - 29)  SpO2: 93% (11 Nov 2019 15:35) (93% - 96%)      CAPILLARY BLOOD GLUCOSE      PHYSICAL EXAM      MEDICATIONS  (STANDING):  amLODIPine   Tablet 5 milliGRAM(s) Oral daily  atorvastatin 80 milliGRAM(s) Oral daily  chlorhexidine 2% Cloths 1 Application(s) Topical <User Schedule>  isosorbide   mononitrate ER Tablet (IMDUR) 30 milliGRAM(s) Oral daily  sodium chloride 0.9%. 500 milliLiter(s) (75 mL/Hr) IV Continuous <Continuous>      LABS                        14.8   7.16  )-----------( 179      ( 11 Nov 2019 07:14 )             42.9     11-11    142  |  104  |  16  ----------------------------<  109<H>  4.2   |  24  |  0.94    Ca    9.6      11 Nov 2019 07:14    TPro  7.6  /  Alb  4.5  /  TBili  0.7  /  DBili  x   /  AST  21  /  ALT  23  /  AlkPhos  65  11-11    LIVER FUNCTIONS - ( 11 Nov 2019 07:14 )  Alb: 4.5 g/dL / Pro: 7.6 g/dL / ALK PHOS: 65 U/L / ALT: 23 U/L / AST: 21 U/L / GGT: x           PT/INR - ( 11 Nov 2019 07:14 )   PT: 11.8 sec;   INR: 1.04          PTT - ( 11 Nov 2019 07:14 )  PTT:36.8 sec    CARDIAC MARKERS ( 11 Nov 2019 07:14 )  x     / x     / 231 U/L / x     / 3.4 ng/mL        Radiology and other tests: Reviewed 11UR TO 5EA TRANSFER ACCEPTANCE NOTE    HOSPITAL COURSE:  65 y/o man with a PMHx of CAD s/p RCA stent at [place], [place] in 2003 at [facility], HTN, HLD  who presented for recommended cardiac catheterization with possible intervention due to CCS Class 2 anginal symptoms, abnormal CTA coronaries and abnormal NST. Pt s/p cardiac cath on 11/11 FFR/rotational artherectomy/PTCA/NAKITA mLAD; EF 50%;  Complicated by an episode of bradycardia s/p 1x 0.5mg IV atropine and 1L NS. Pt also had an episode of SOB and nausea, that improves with sitting up and laying down; BP 93/60, HR 61; EKG without changes. Echo showing no R side collapse, mod-moderate MR. Stepped up to CCU for close monitoring.    SUBJECTIVE/INTERVAL HPI: Patient was seen and examined at bedside upon arrival at CCU. Patient reports no complaints or discomfort. Patient denies any headache, CP, SOB, abdominal pain, and dysuria.    VITALS  Vital Signs Last 24 Hrs  T(C): --  T(F): --  HR: 76 (11 Nov 2019 15:35) (70 - 76)  BP: 118/66 (11 Nov 2019 15:35) (118/66 - 126/67)  BP(mean): 84 (11 Nov 2019 15:35) (82 - 93)  RR: 27 (11 Nov 2019 15:35) (19 - 29)  SpO2: 93% (11 Nov 2019 15:35) (93% - 96%)      CAPILLARY BLOOD GLUCOSE      PHYSICAL EXAM  GENERAL: In NAD. Lying in bed comfortably.  HEENT: NC/AT. PERRL. EOMI. Neck supple. No JVD.  CV: RRR. +S1/S2. No murmurs, rubs or gallops  LUNGS: Clear to auscultation b/l. No wheezing, rales or rhonchi.  ABDOMEN: Soft, nontender, nondistended. +BSx4.   EXT: WWP. No edema in b/l extremities  VASCULAR: 2+ radial, DP/PT bilaterally     MEDICATIONS  (STANDING):  amLODIPine   Tablet 5 milliGRAM(s) Oral daily  atorvastatin 80 milliGRAM(s) Oral daily  chlorhexidine 2% Cloths 1 Application(s) Topical <User Schedule>  isosorbide   mononitrate ER Tablet (IMDUR) 30 milliGRAM(s) Oral daily  sodium chloride 0.9%. 500 milliLiter(s) (75 mL/Hr) IV Continuous <Continuous>    LABS                        14.8   7.16  )-----------( 179      ( 11 Nov 2019 07:14 )             42.9     11-11    142  |  104  |  16  ----------------------------<  109<H>  4.2   |  24  |  0.94    Ca    9.6      11 Nov 2019 07:14    TPro  7.6  /  Alb  4.5  /  TBili  0.7  /  DBili  x   /  AST  21  /  ALT  23  /  AlkPhos  65  11-11    LIVER FUNCTIONS - ( 11 Nov 2019 07:14 )  Alb: 4.5 g/dL / Pro: 7.6 g/dL / ALK PHOS: 65 U/L / ALT: 23 U/L / AST: 21 U/L / GGT: x           PT/INR - ( 11 Nov 2019 07:14 )   PT: 11.8 sec;   INR: 1.04          PTT - ( 11 Nov 2019 07:14 )  PTT:36.8 sec    CARDIAC MARKERS ( 11 Nov 2019 07:14 )  x     / x     / 231 U/L / x     / 3.4 ng/mL      Radiology and other tests: Reviewed

## 2019-11-11 NOTE — CHART NOTE - NSCHARTNOTEFT_GEN_A_CORE
PA called by nurse for CC of SOB and nausea. Pt. seen at bedside; sitting up with 02; denies any CP; but c/o SOB with nausea; reports that the SOB and nausea is slightly better with sitting up then laying down; BP 93/60; HR 61; EKG done was unchanged; pt. given Zofran 4 mg IV X 1 with improvement in nausea; Fellow Jody and Dr. Sharp at bedside; pt. ordered for  cc bolus X 1; stat ECHO ordered (being performed currently at bedside); will continue to monitor pt. closely. PA called by nurse for CC of SOB and nausea. Pt. seen at bedside; sitting up with 02; denies any CP; but c/o SOB with nausea; reports that the SOB and nausea is slightly better with sitting up then laying down; BP 93/60; HR 61; EKG done was unchanged; pt. given Zofran 4 mg IV X 1 with improvement in nausea; Fellow Jody and Dr. Sharp at bedside; pt. ordered for  cc bolus X 1; stat ECHO ordered (being performed currently at bedside); pt. to be stepped up to CCU to monitor pt. closely as per d/w Dr. Sharp.

## 2019-11-12 ENCOUNTER — TRANSCRIPTION ENCOUNTER (OUTPATIENT)
Age: 64
End: 2019-11-12

## 2019-11-12 VITALS
TEMPERATURE: 99 F | OXYGEN SATURATION: 96 % | SYSTOLIC BLOOD PRESSURE: 137 MMHG | RESPIRATION RATE: 24 BRPM | DIASTOLIC BLOOD PRESSURE: 68 MMHG | HEART RATE: 66 BPM

## 2019-11-12 LAB
ANION GAP SERPL CALC-SCNC: 9 MMOL/L — SIGNIFICANT CHANGE UP (ref 5–17)
BLD GP AB SCN SERPL QL: NEGATIVE — SIGNIFICANT CHANGE UP
BUN SERPL-MCNC: 13 MG/DL — SIGNIFICANT CHANGE UP (ref 7–23)
CALCIUM SERPL-MCNC: 9.2 MG/DL — SIGNIFICANT CHANGE UP (ref 8.4–10.5)
CHLORIDE SERPL-SCNC: 106 MMOL/L — SIGNIFICANT CHANGE UP (ref 96–108)
CO2 SERPL-SCNC: 26 MMOL/L — SIGNIFICANT CHANGE UP (ref 22–31)
CREAT SERPL-MCNC: 0.98 MG/DL — SIGNIFICANT CHANGE UP (ref 0.5–1.3)
GLUCOSE SERPL-MCNC: 102 MG/DL — HIGH (ref 70–99)
HBA1C BLD-MCNC: 5.8 % — HIGH (ref 4–5.6)
HCT VFR BLD CALC: 39.6 % — SIGNIFICANT CHANGE UP (ref 39–50)
HCV AB S/CO SERPL IA: 0.13 S/CO — SIGNIFICANT CHANGE UP
HCV AB SERPL-IMP: SIGNIFICANT CHANGE UP
HGB BLD-MCNC: 13.6 G/DL — SIGNIFICANT CHANGE UP (ref 13–17)
MAGNESIUM SERPL-MCNC: 2.2 MG/DL — SIGNIFICANT CHANGE UP (ref 1.6–2.6)
MCHC RBC-ENTMCNC: 31.6 PG — SIGNIFICANT CHANGE UP (ref 27–34)
MCHC RBC-ENTMCNC: 34.3 GM/DL — SIGNIFICANT CHANGE UP (ref 32–36)
MCV RBC AUTO: 92.1 FL — SIGNIFICANT CHANGE UP (ref 80–100)
NRBC # BLD: 0 /100 WBCS — SIGNIFICANT CHANGE UP (ref 0–0)
PHOSPHATE SERPL-MCNC: 3.3 MG/DL — SIGNIFICANT CHANGE UP (ref 2.5–4.5)
PLATELET # BLD AUTO: 162 K/UL — SIGNIFICANT CHANGE UP (ref 150–400)
POTASSIUM SERPL-MCNC: 4.1 MMOL/L — SIGNIFICANT CHANGE UP (ref 3.5–5.3)
POTASSIUM SERPL-SCNC: 4.1 MMOL/L — SIGNIFICANT CHANGE UP (ref 3.5–5.3)
RBC # BLD: 4.3 M/UL — SIGNIFICANT CHANGE UP (ref 4.2–5.8)
RBC # FLD: 12 % — SIGNIFICANT CHANGE UP (ref 10.3–14.5)
RH IG SCN BLD-IMP: POSITIVE — SIGNIFICANT CHANGE UP
SODIUM SERPL-SCNC: 141 MMOL/L — SIGNIFICANT CHANGE UP (ref 135–145)
TSH SERPL-MCNC: 1.35 UIU/ML — SIGNIFICANT CHANGE UP (ref 0.35–4.94)
WBC # BLD: 5.96 K/UL — SIGNIFICANT CHANGE UP (ref 3.8–10.5)
WBC # FLD AUTO: 5.96 K/UL — SIGNIFICANT CHANGE UP (ref 3.8–10.5)

## 2019-11-12 RX ORDER — CLOPIDOGREL BISULFATE 75 MG/1
1 TABLET, FILM COATED ORAL
Qty: 30 | Refills: 5
Start: 2019-11-12 | End: 2020-05-09

## 2019-11-12 RX ADMIN — ATORVASTATIN CALCIUM 80 MILLIGRAM(S): 80 TABLET, FILM COATED ORAL at 12:03

## 2019-11-12 RX ADMIN — Medication 81 MILLIGRAM(S): at 12:03

## 2019-11-12 RX ADMIN — CHLORHEXIDINE GLUCONATE 1 APPLICATION(S): 213 SOLUTION TOPICAL at 06:31

## 2019-11-12 RX ADMIN — CLOPIDOGREL BISULFATE 75 MILLIGRAM(S): 75 TABLET, FILM COATED ORAL at 12:03

## 2019-11-12 RX ADMIN — AMLODIPINE BESYLATE 5 MILLIGRAM(S): 2.5 TABLET ORAL at 06:30

## 2019-11-12 NOTE — DISCHARGE NOTE NURSING/CASE MANAGEMENT/SOCIAL WORK - PATIENT PORTAL LINK FT
You can access the FollowMyHealth Patient Portal offered by [facility] by registering at the following website: [email or web address]. By joining [facility]’s FollowMyHealth portal, you will also be able to view your health information using other applications (apps) compatible with our system.

## 2019-11-12 NOTE — DISCHARGE NOTE PROVIDER - NSDCMRMEDTOKEN_GEN_ALL_CORE_FT
aspirin 81 mg oral tablet: 1 tab(s) orally once a day  atorvastatin 80 mg oral tablet: 1 tab(s) orally once a day  Imdur 30 mg oral tablet, extended release: 1 tab(s) orally once a day (in the morning)  lisinopril 40 mg oral tablet: 1 tab(s) orally once a day  Norvasc 5 mg oral tablet: 1 tab(s) orally once a day  Zetia 10 mg oral tablet: 1 tab(s) orally once a day aspirin 81 mg oral tablet: 1 tab(s) orally once a day  atorvastatin 80 mg oral tablet: 1 tab(s) orally once a day  clopidogrel 75 mg oral tablet: 1 tab(s) orally once a day  Imdur 30 mg oral tablet, extended release: 1 tab(s) orally once a day (in the morning)  lisinopril 40 mg oral tablet: 1 tab(s) orally once a day  Norvasc 5 mg oral tablet: 1 tab(s) orally once a day  Zetia 10 mg oral tablet: 1 tab(s) orally once a day

## 2019-11-12 NOTE — DISCHARGE NOTE PROVIDER - NSDCFUSCHEDAPPT_GEN_ALL_CORE_FT
IVETH HELMS ; 11/15/2019 ; [facility] [address]  IVETH HELMS ; 01/28/2020 ; [facility] Card[facility] [address]

## 2019-11-12 NOTE — DISCHARGE NOTE NURSING/CASE MANAGEMENT/SOCIAL WORK - NSDCPNDISPN_GEN_ALL_CORE
Side effects of pain management treatment/Activities of daily living, including home environment that might     exacerbate pain or reduce effectiveness of the pain management plan of care as well as strategies to address these issues/Safe use, storage and disposal of opioids when prescribed/Opioids not applicable/not prescribed/Education provided on the pain management plan of care

## 2019-11-12 NOTE — DISCHARGE NOTE PROVIDER - HOSPITAL COURSE
Patient is a 65 y/o man with a PMHx of CAD s/p RCA stent at [place], [place] in 2003 at [facility], HTN, HLD who presented for recommended cardiac catheterization with possible intervention due to CCS Class 2 anginal symptoms, abnormal CTA coronaries and abnormal NST. Pt s/p cardiac cath on 11/11 FFR/rotational artherectomy/PTCA/NAKITA mLAD; EF 50%;  Complicated by an episode of bradycardia s/p 1x 0.5mg IV atropine and 1L NS and an episode of SOB and nausea. BP 93/60, HR 61; EKG without changes. Echo showing no R side collapse, mod-moderate MR. Stepped up to CCU for close monitoring.        Problem List/Main Diagnoses (system-based):     #Bradycardia         #CAD - Pt had scheduled cardiac cath on 11/11, FFR/rotational artherectomy/PTCA/NAKITA to mLAD            Inpatient treatment course: Pt received scheduled cardiac cath on 11/11 FFR/rotational artherectomy/PTCA/NAKITA mLAD. 1x 0.5mg IV atropine and 1L NS given for bradycardia. No further treatment required in the CCU.    New medications:     Labs to be followed outpatient:     Exam to be followed outpatient: Patient is a 63 y/o man with a PMHx of CAD s/p RCA stent at [place], [place] in 2003 at [facility], HTN, HLD who presented for recommended cardiac catheterization with possible intervention due to CCS Class 2 anginal symptoms, abnormal CTA coronaries and abnormal NST. Pt s/p cardiac cath on 11/11 FFR/rotational artherectomy/PTCA/NAKITA mLAD; EF 50%; complicated by an episode of bradycardia s/p 1x 0.5mg IV atropine and 1L NS and an episode of SOB and nausea, that has now resolved. Pt stayed overnight in the CCU for monitoring and discharged on 11/12/19 without any new symptoms.        Problem List/Main Diagnoses (system-based):     #CAD - Pt had scheduled cardiac cath on 11/11, FFR/rotational artherectomy/PTCA/NAKITA to mLAD. Complicated by one episode of bradycardia with 2-3sec pause, resolved after 1x atropine 0.5mg IV. Echo showing no R side collapse, mod-moderate MR. Pt monitored overnight in the CCU, during which no new symptoms were observed.         #HTN - pt continued on home lisinopril 40mg qd, amlodipine 5mg qd and imdur 30mg qd.    #HLD - pt continued on home atorvastatin 80mg po qd        Inpatient treatment course: Pt received scheduled cardiac cath on 11/11 FFR/rotational artherectomy/PTCA/NAKITA mLAD. 1x 0.5mg IV atropine and 1L NS given for bradycardia. Patient continued on asa 81mg, plavix 75mg, atorvastatin 80mg po qd for CAD. Continued on home lisinopril 40mg qd, amlodipine 5mg qd and imdur 30mg qd. No further treatment required in the CCU.        New medications: Plavix 75mg po qd    Labs to be followed outpatient: none    Exam to be followed outpatient: none

## 2019-11-12 NOTE — DISCHARGE NOTE PROVIDER - NSDCFUADDAPPT_GEN_ALL_CORE_FT
Please follow-up with your cardiologist Dr. Aguilar upon your discharge from [facility] as soon as possible.    You have an appointment scheduled with Heart and Vascular on 11/15/2019 and Cardiology on 1/28/2020 Please follow-up with your cardiologist Dr. Aguilar upon your discharge from [facility] as soon as possible.    You have an appointment scheduled with Heart and Vascular on 11/15/2019 and Cardiology on 1/28/2020    Please also schedule an appointment with your primary care physician as soon as possible after your discharge from the hospital for follow-up on your medical conditions.

## 2019-11-12 NOTE — DISCHARGE NOTE PROVIDER - NSDCCPCAREPLAN_GEN_ALL_CORE_FT
PRINCIPAL DISCHARGE DIAGNOSIS  Diagnosis: Bradycardia  Assessment and Plan of Treatment: You had an episode of bradycardia or slow heart rate as well as shortness of breath after your heart catheterization on 11/11/2019. You were kept in the cardiac care unit overnight for close monitoring. You had no new symptoms while your were int he cardiac care unit.      SECONDARY DISCHARGE DIAGNOSES  Diagnosis: Coronary artery disease  Assessment and Plan of Treatment: You have a history of coronary artery disease with prior stent placed into the right coronary artery.  You came to the hospital for a scheduled heart catherization for coronary artery disease on 11/11/2019 for which you had a drug-eluting stent placed into the middle left anterior descending coronary artery.  Please continue taking  - aspirin 81mg orally once a day  - clopidogrel 75mg orally once a day    Diagnosis: Hypertension  Assessment and Plan of Treatment: You have a known history of high blood pressure prior to your admission. High blood pressure can cause damage to your heart and kidneys and increases your risk of heart attack and stroke. To avoid this, It is important that you continue to take this medication when you are discharged so that you can continue to control your blood pressure. Additionally be sure to follow up with your primary care physician on a regular basis to make sure your blood pressure continues to be well controlled. If you experience symptoms such as but not limited to: sudden onset blurry vision, nausea, vomiting, chest pain, shortness of breath, or palpitations, please go to the nearest emergency room.  To manage this, please continue taking  - lisinopril 40mg orally once a day  - amlodipine 5mg orally once a day  - Imdur ER 30mg orally once a day    Diagnosis: Hyperlipidemia  Assessment and Plan of Treatment: You have a known history of high cholesterol prior to your admission. . High cholesterol is bad because it can cause damage to your heart and puts you at risk for heart attack and stroke.  It is important that you continue to take this medication when you are discharged so that you can continue to control your level of cholesterol. Additionally be sure to follow up with your primary care physician on a regular basis to make sure your triglyceride and cholesterol levels continue to be well controlled.   To manage this, please continue taking  - atorvastatin 80mg orally once a day PRINCIPAL DISCHARGE DIAGNOSIS  Diagnosis: CAD (coronary artery disease)  Assessment and Plan of Treatment: You have a history of coronary artery disease with prior stent placed into the right coronary artery.  You came to the hospital for a scheduled heart catherization for coronary artery disease on 11/11/2019 for which you had a drug-eluting stent placed into the middle left anterior descending coronary artery. You had an episode of bradycardia or slow heart rate, and an episode of shortness of breath after your catheterization. You received IV atropine in the hospital for the slow heart rate, after which the heart rate returned to normal. You stayed overnight in the cardiac care unit for close monitoring, and no new symptoms were observed and thus you were discharged home.  Please continue taking  - aspirin 81mg orally once a day  - clopidogrel 75mg orally once a day      SECONDARY DISCHARGE DIAGNOSES  Diagnosis: Hypertension  Assessment and Plan of Treatment: You have a known history of high blood pressure prior to your admission. High blood pressure can cause damage to your heart and kidneys and increases your risk of heart attack and stroke. To avoid this, It is important that you continue to take this medication when you are discharged so that you can continue to control your blood pressure. Additionally be sure to follow up with your primary care physician on a regular basis to make sure your blood pressure continues to be well controlled. If you experience symptoms such as but not limited to: sudden onset blurry vision, nausea, vomiting, chest pain, shortness of breath, or palpitations, please go to the nearest emergency room.  To manage this, please continue taking  - lisinopril 40mg orally once a day  - amlodipine 5mg orally once a day  - Imdur ER 30mg orally once a day    Diagnosis: Hyperlipidemia  Assessment and Plan of Treatment: You have a known history of high cholesterol prior to your admission. . High cholesterol is bad because it can cause damage to your heart and puts you at risk for heart attack and stroke.  It is important that you continue to take this medication when you are discharged so that you can continue to control your level of cholesterol. Additionally be sure to follow up with your primary care physician on a regular basis to make sure your triglyceride and cholesterol levels continue to be well controlled.   To manage this, please continue taking  - atorvastatin 80mg orally once a day  - zetia 10mg orally once a day

## 2019-11-14 PROBLEM — I10 ESSENTIAL (PRIMARY) HYPERTENSION: Chronic | Status: ACTIVE | Noted: 2019-11-11

## 2019-11-14 PROBLEM — E78.5 HYPERLIPIDEMIA, UNSPECIFIED: Chronic | Status: ACTIVE | Noted: 2019-11-11

## 2019-11-15 ENCOUNTER — APPOINTMENT (OUTPATIENT)
Dept: HEART AND VASCULAR | Facility: CLINIC | Age: 64
End: 2019-11-15
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
## 2019-11-15 DIAGNOSIS — I10 ESSENTIAL (PRIMARY) HYPERTENSION: ICD-10-CM

## 2019-11-15 DIAGNOSIS — R06.02 SHORTNESS OF BREATH: ICD-10-CM

## 2019-11-15 DIAGNOSIS — E78.5 HYPERLIPIDEMIA, UNSPECIFIED: ICD-10-CM

## 2019-11-15 DIAGNOSIS — I34.0 NONRHEUMATIC MITRAL (VALVE) INSUFFICIENCY: ICD-10-CM

## 2019-11-15 DIAGNOSIS — R00.1 BRADYCARDIA, UNSPECIFIED: ICD-10-CM

## 2019-11-15 DIAGNOSIS — Z79.82 LONG TERM (CURRENT) USE OF ASPIRIN: ICD-10-CM
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
## 2019-11-15 DIAGNOSIS — I25.2 OLD MYOCARDIAL INFARCTION: ICD-10-CM
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
## 2019-11-15 DIAGNOSIS — I25.10 ATHEROSCLEROTIC HEART DISEASE OF NATIVE CORONARY ARTERY WITHOUT ANGINA PECTORIS: ICD-10-CM

## 2019-11-15 DIAGNOSIS — I97.790 OTHER INTRAOPERATIVE CARDIAC FUNCTIONAL DISTURBANCES DURING CARDIAC SURGERY: ICD-10-CM

## 2019-11-15 DIAGNOSIS — T82.855A STENOSIS OF CORONARY ARTERY STENT, INITIAL ENCOUNTER: ICD-10-CM

## 2019-11-15 DIAGNOSIS — I25.84 CORONARY ATHEROSCLEROSIS DUE TO CALCIFIED CORONARY LESION: ICD-10-CM

## 2019-11-15 DIAGNOSIS — R11.0 NAUSEA: ICD-10-CM

## 2019-11-18 ENCOUNTER — APPOINTMENT (OUTPATIENT)
Dept: CARDIOLOGY | Facility: CLINIC | Age: 64
End: 2019-11-18
Payer: COMMERCIAL

## 2019-11-18 VITALS — HEART RATE: 73 BPM | DIASTOLIC BLOOD PRESSURE: 70 MMHG | SYSTOLIC BLOOD PRESSURE: 140 MMHG

## 2019-11-18 PROCEDURE — 93015 CV STRESS TEST SUPVJ I&R: CPT

## 2019-11-18 PROCEDURE — 99213 OFFICE O/P EST LOW 20 MIN: CPT | Mod: 25

## 2019-11-18 RX ORDER — ISOSORBIDE MONONITRATE 30 MG/1
30 TABLET, EXTENDED RELEASE ORAL
Qty: 90 | Refills: 3 | Status: DISCONTINUED | COMMUNITY
Start: 2019-11-06 | End: 2019-11-18

## 2019-11-18 NOTE — CARDIOLOGY SUMMARY
[No Ischemia] : no Ischemia [___] : [unfilled] [LVEF ___%] : LVEF [unfilled]% [Enlarged] : enlarged LA size [Mild] : mild mitral regurgitation

## 2019-11-18 NOTE — REASON FOR VISIT
[Follow-Up - From Hospitalization] : follow-up of a recent hospitalization for [FreeTextEntry2] : s/p stent

## 2019-11-18 NOTE — HISTORY OF PRESENT ILLNESS
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 ([facility]). \par Intermittent chest pain, abnormal CTA.  NAKITA to LAD performed 11/11/19.  Episode of bradycardia post procedure responded to atropine.  \par Now on Plavix

## 2019-11-18 NOTE — REVIEW OF SYSTEMS
[Recent Weight Gain (___ Lbs)] : no recent weight gain [Shortness Of Breath] : no shortness of breath [Chest Pain] : no chest pain [Cough] : no cough [Lower Ext Edema] : no extremity edema [Palpitations] : no palpitations [Dizziness] : dizziness [Easy Bleeding] : no tendency for easy bleeding [Easy Bruising] : no tendency for easy bruising [Negative] : Musculoskeletal

## 2019-11-18 NOTE — PHYSICAL EXAM
[Normal Appearance] : normal appearance [General Appearance - In No Acute Distress] : no acute distress [Respiration, Rhythm And Depth] : normal respiratory rhythm and effort [Auscultation Breath Sounds / Voice Sounds] : lungs were clear to auscultation bilaterally [Heart Rate And Rhythm] : heart rate and rhythm were normal [Edema] : no peripheral edema present [Abnormal Walk] : normal gait [Heart Sounds] : normal S1 and S2 [Skin Color & Pigmentation] : normal skin color and pigmentation

## 2019-12-06 ENCOUNTER — RX RENEWAL (OUTPATIENT)
Age: 64
End: 2019-12-06

## 2019-12-23 PROCEDURE — 86850 RBC ANTIBODY SCREEN: CPT

## 2019-12-23 PROCEDURE — C1887: CPT

## 2019-12-23 PROCEDURE — 85730 THROMBOPLASTIN TIME PARTIAL: CPT

## 2019-12-23 PROCEDURE — 83036 HEMOGLOBIN GLYCOSYLATED A1C: CPT

## 2019-12-23 PROCEDURE — 86803 HEPATITIS C AB TEST: CPT

## 2019-12-23 PROCEDURE — 85027 COMPLETE CBC AUTOMATED: CPT

## 2019-12-23 PROCEDURE — 80061 LIPID PANEL: CPT

## 2019-12-23 PROCEDURE — 85025 COMPLETE CBC W/AUTO DIFF WBC: CPT

## 2019-12-23 PROCEDURE — C1769: CPT

## 2019-12-23 PROCEDURE — C1724: CPT

## 2019-12-23 PROCEDURE — 86900 BLOOD TYPING SEROLOGIC ABO: CPT

## 2019-12-23 PROCEDURE — 93005 ELECTROCARDIOGRAM TRACING: CPT

## 2019-12-23 PROCEDURE — 85610 PROTHROMBIN TIME: CPT

## 2019-12-23 PROCEDURE — 86901 BLOOD TYPING SEROLOGIC RH(D): CPT

## 2019-12-23 PROCEDURE — 80048 BASIC METABOLIC PNL TOTAL CA: CPT

## 2019-12-23 PROCEDURE — 83735 ASSAY OF MAGNESIUM: CPT

## 2019-12-23 PROCEDURE — 93306 TTE W/DOPPLER COMPLETE: CPT

## 2019-12-23 PROCEDURE — C1894: CPT

## 2019-12-23 PROCEDURE — C1725: CPT

## 2019-12-23 PROCEDURE — 82550 ASSAY OF CK (CPK): CPT

## 2019-12-23 PROCEDURE — C1874: CPT

## 2019-12-23 PROCEDURE — C1889: CPT

## 2019-12-23 PROCEDURE — 82553 CREATINE MB FRACTION: CPT

## 2019-12-23 PROCEDURE — 36415 COLL VENOUS BLD VENIPUNCTURE: CPT

## 2019-12-23 PROCEDURE — 84100 ASSAY OF PHOSPHORUS: CPT

## 2019-12-23 PROCEDURE — 84443 ASSAY THYROID STIM HORMONE: CPT

## 2019-12-23 PROCEDURE — 80053 COMPREHEN METABOLIC PANEL: CPT

## 2020-01-28 ENCOUNTER — APPOINTMENT (OUTPATIENT)
Dept: CARDIOLOGY | Facility: CLINIC | Age: 65
End: 2020-01-28
Payer: COMMERCIAL

## 2020-01-28 ENCOUNTER — NON-APPOINTMENT (OUTPATIENT)
Age: 65
End: 2020-01-28

## 2020-01-28 VITALS
SYSTOLIC BLOOD PRESSURE: 120 MMHG | HEART RATE: 52 BPM | HEIGHT: 70 IN | BODY MASS INDEX: 35.93 KG/M2 | DIASTOLIC BLOOD PRESSURE: 72 MMHG | WEIGHT: 251 LBS

## 2020-01-28 DIAGNOSIS — E78.5 HYPERLIPIDEMIA, UNSPECIFIED: ICD-10-CM

## 2020-01-28 DIAGNOSIS — Z95.5 PRESENCE OF CORONARY ANGIOPLASTY IMPLANT AND GRAFT: ICD-10-CM

## 2020-01-28 DIAGNOSIS — I10 ESSENTIAL (PRIMARY) HYPERTENSION: ICD-10-CM

## 2020-01-28 DIAGNOSIS — Z91.19 PATIENT'S NONCOMPLIANCE WITH OTHER MEDICAL TREATMENT AND REGIMEN: ICD-10-CM

## 2020-01-28 DIAGNOSIS — Z91.89 OTHER SPECIFIED PERSONAL RISK FACTORS, NOT ELSEWHERE CLASSIFIED: ICD-10-CM

## 2020-01-28 DIAGNOSIS — I25.10 ATHEROSCLEROTIC HEART DISEASE OF NATIVE CORONARY ARTERY W/OUT ANGINA PECTORIS: ICD-10-CM

## 2020-01-28 PROCEDURE — 99214 OFFICE O/P EST MOD 30 MIN: CPT

## 2020-01-28 PROCEDURE — 93000 ELECTROCARDIOGRAM COMPLETE: CPT

## 2020-01-28 NOTE — REASON FOR VISIT
[Follow-Up - Clinic] : a clinic follow-up of [Coronary Artery Disease] : coronary artery disease [FreeTextEntry2] : -6 month

## 2020-01-28 NOTE — HISTORY OF PRESENT ILLNESS
[FreeTextEntry1] : Mr Lacey has been followed here since 2003 at the  time of his inferoposterior MI, TNK and  RCA stent. ([facility]).\par Since last visit in November he has not been hospitalized or to er or urent care.\par In general he feels well, he has moved to [place]..\par He describes a feeling in his chest a few hours after taking his medications- like a palpitations, sporadic, it lasts less than a minute, resolved spontaneously, "a strange feeling  - like muscles acting weird".He questions if this is a side effect of medications.\par He denies chest pain, has rare chest  tightness, no dyspnea, syncope.\par He doesn't exercise.

## 2020-01-29 LAB
ALBUMIN SERPL ELPH-MCNC: 4.8 G/DL
ALP BLD-CCNC: 66 U/L
ALT SERPL-CCNC: 25 U/L
AST SERPL-CCNC: 19 U/L
BILIRUB DIRECT SERPL-MCNC: 0.2 MG/DL
BILIRUB INDIRECT SERPL-MCNC: 0.5 MG/DL
BILIRUB SERPL-MCNC: 0.7 MG/DL
CHOLEST SERPL-MCNC: 119 MG/DL
CHOLEST/HDLC SERPL: 2.6 RATIO
CK SERPL-CCNC: 133 U/L
HDLC SERPL-MCNC: 46 MG/DL
LDLC SERPL CALC-MCNC: 53 MG/DL
PROT SERPL-MCNC: 6.9 G/DL
TRIGL SERPL-MCNC: 100 MG/DL

## 2020-02-11 ENCOUNTER — TRANSCRIPTION ENCOUNTER (OUTPATIENT)
Age: 65
End: 2020-02-11

## 2020-04-27 RX ORDER — CLOPIDOGREL BISULFATE 75 MG/1
75 TABLET, FILM COATED ORAL DAILY
Refills: 0 | Status: COMPLETED | COMMUNITY
Start: 2019-11-12 | End: 2020-04-27

## 2020-04-27 RX ORDER — EZETIMIBE 10 MG/1
10 TABLET ORAL DAILY
Qty: 90 | Refills: 3 | Status: COMPLETED | COMMUNITY
Start: 2019-10-29 | End: 2020-04-27

## 2020-09-03 ENCOUNTER — RX RENEWAL (OUTPATIENT)
Age: 65
End: 2020-09-03

## 2020-09-04 ENCOUNTER — TRANSCRIPTION ENCOUNTER (OUTPATIENT)
Age: 65
End: 2020-09-04

## 2020-11-23 ENCOUNTER — RX RENEWAL (OUTPATIENT)
Age: 65
End: 2020-11-23

## 2021-01-19 ENCOUNTER — RX RENEWAL (OUTPATIENT)
Age: 66
End: 2021-01-19

## 2021-02-24 ENCOUNTER — RX RENEWAL (OUTPATIENT)
Age: 66
End: 2021-02-24

## 2021-04-30 ENCOUNTER — RX RENEWAL (OUTPATIENT)
Age: 66
End: 2021-04-30

## 2021-06-22 NOTE — H&P ADULT - CLICK TO LAUNCH ORM
Met with Alexandra and her spouse Meng individually to check in and inquire about their needs.  Both report they are very grateful for the degree of treatment Alexandra has needed and feel blessed she did not have to have chemotherapy.  Welcomed calls. Leidy Patton RN     .

## 2021-10-06 PROBLEM — I10 ESSENTIAL HYPERTENSION: Status: ACTIVE | Noted: 2018-12-28

## 2022-03-06 ENCOUNTER — RX RENEWAL (OUTPATIENT)
Age: 67
End: 2022-03-06

## 2023-07-19 ENCOUNTER — NON-APPOINTMENT (OUTPATIENT)
Age: 68
End: 2023-07-19

## 2023-07-20 ENCOUNTER — NON-APPOINTMENT (OUTPATIENT)
Age: 68
End: 2023-07-20

## 2023-07-20 ENCOUNTER — APPOINTMENT (OUTPATIENT)
Dept: HEART AND VASCULAR | Facility: CLINIC | Age: 68
End: 2023-07-20
Payer: COMMERCIAL

## 2023-07-20 VITALS
BODY MASS INDEX: 36.36 KG/M2 | HEART RATE: 69 BPM | SYSTOLIC BLOOD PRESSURE: 167 MMHG | WEIGHT: 254 LBS | HEIGHT: 70 IN | OXYGEN SATURATION: 98 % | DIASTOLIC BLOOD PRESSURE: 85 MMHG | TEMPERATURE: 97.9 F

## 2023-07-20 DIAGNOSIS — R07.9 CHEST PAIN, UNSPECIFIED: ICD-10-CM

## 2023-07-20 DIAGNOSIS — I25.2 OLD MYOCARDIAL INFARCTION: ICD-10-CM

## 2023-07-20 PROCEDURE — 93000 ELECTROCARDIOGRAM COMPLETE: CPT

## 2023-07-20 PROCEDURE — 99205 OFFICE O/P NEW HI 60 MIN: CPT | Mod: 25

## 2023-07-20 PROCEDURE — 36415 COLL VENOUS BLD VENIPUNCTURE: CPT

## 2023-07-20 RX ORDER — EZETIMIBE 10 MG/1
10 TABLET ORAL
Qty: 90 | Refills: 3 | Status: ACTIVE | COMMUNITY
Start: 2023-07-20 | End: 1900-01-01

## 2023-07-20 RX ORDER — AMLODIPINE BESYLATE 10 MG/1
10 TABLET ORAL
Qty: 30 | Refills: 6 | Status: ACTIVE | COMMUNITY
Start: 2023-07-20 | End: 1900-01-01

## 2023-07-20 RX ORDER — LISINOPRIL 40 MG/1
40 TABLET ORAL
Qty: 90 | Refills: 3 | Status: ACTIVE | COMMUNITY
Start: 2023-07-20 | End: 1900-01-01

## 2023-07-20 RX ORDER — ASPIRIN 81 MG/1
81 TABLET, CHEWABLE ORAL DAILY
Qty: 90 | Refills: 3 | Status: ACTIVE | COMMUNITY
Start: 1900-01-01 | End: 1900-01-01

## 2023-07-20 NOTE — HISTORY OF PRESENT ILLNESS
[FreeTextEntry1] : \par 69 y/o male with  h/o inferopost MI s/p TNK, s/p PCI RCA , cad s/p PCI  pLAD , obesity, htn, hl, family h/o early cvd who presents for initial evaluation today.\par \par \par \par notes some chest tightness once every few months - worse w stress\par nonradiating, 2/10, lasts minutes, no associated symptoms\par \par no sob\par notes RHOADES \par no syncope, lh, palpitations, edema, orthopnea, pnd\par \par \par off all meds for past year\par \par \par former patient of Dr. Aguilar - last seen \par \par not exercising \par diet not great\par no mental health issues\par \par \par \par Stress Test: 2019, no Ischemia, 5:56, 79%mphr \par Echo: , enlarged LA size, mild mitral regurgitation LVEF 55%. \par Cardiac Cath:  99% RCA s/p PCI RCA\par CTA 2019: LM prob significant >50%, prox/mid LAD severe, D1 moderate, prox LCx mild, OM1 mild, proxRCA mild, mid RCA stent, distal to RCA stent severe, dRCA severe, RPLV mild, mild thoracid ao athero, subendo perfusion multiple segments in RCA territory\par Cath 2019: distal LM 30%, prox LAD 70% s/p NAKITA prox LAD, LCx normal, mid RCA 50%, ef 50% w inf wall HK, edp 12, no as/mr\par Nuclear 2019: submax 82% mphr, perfusion c/w prior inflat MI w mod periinfarct ischemia, mild TID\par \par PMH/PSH:\par inferopost MI s/p TNK, RCA PCI \par s/p PCI LAD \par obesity\par htn\par hl\par achilles tendinitis\par cataract\par spinal stenosis\par \par \par ALL:\par crestor - dark urine\par \par \par MEDS:\par off \par (lipitor 80, zetia 10,  asa 81, lisinopril 40 mg, norvasc 10)\par \par \par SH:\par no tobacco\par social etoh\par no drugs\par \par from [place]\par \par 2 children - 20's - healthy\par \par \par FH:\par mother -  , MI 60's, htn\par father -  89, MI 50's\par sister - alive, healthy, 70\par 4 - 1/2 sister (1  lung cancer)

## 2023-07-20 NOTE — PHYSICAL EXAM
[Well Developed] : well developed [Well Nourished] : well nourished [No Acute Distress] : no acute distress [Normal Conjunctiva] : normal conjunctiva [Normal Venous Pressure] : normal venous pressure [No Carotid Bruit] : no carotid bruit [Normal S1, S2] : normal S1, S2 [No Rub] : no rub [No Gallop] : no gallop [Clear Lung Fields] : clear lung fields [Good Air Entry] : good air entry [No Respiratory Distress] : no respiratory distress  [Soft] : abdomen soft [Non Tender] : non-tender [No Masses/organomegaly] : no masses/organomegaly [Normal Bowel Sounds] : normal bowel sounds [Normal Gait] : normal gait [No Edema] : no edema [No Cyanosis] : no cyanosis [No Clubbing] : no clubbing [No Varicosities] : no varicosities [No Rash] : no rash [No Skin Lesions] : no skin lesions [Moves all extremities] : moves all extremities [No Focal Deficits] : no focal deficits [Normal Speech] : normal speech [Alert and Oriented] : alert and oriented [Normal memory] : normal memory [de-identified] : 2/6 jourdan flores

## 2023-07-20 NOTE — DISCUSSION/SUMMARY
[Patient] : the patient [___ Month(s)] : in [unfilled] month(s) [EKG obtained to assist in diagnosis and management of assessed problem(s)] : EKG obtained to assist in diagnosis and management of assessed problem(s) [FreeTextEntry1] : \par 67 y/o male with  h/o inferopost MI s/p TNK, s/p PCI RCA 2003, cad s/p PCI  pLAD 2019, obesity, htn, hl, family h/o early cvd who presents for initial evaluation today.\par \par -ordered CTA for cp \par -ordered echo for cp\par -Stress Test: 11/2019, no Ischemia, 5:56, 79%mphr \par -Echo: 2016, enlarged LA size, mild mitral regurgitation LVEF 55%. \par -Cardiac Cath: 2003 99% RCA s/p PCI RCA\par -CTA 2019: LM prob significant >50%, prox/mid LAD severe, D1 moderate, prox LCx mild, OM1 mild, proxRCA mild, mid RCA stent, distal to RCA stent severe, dRCA severe, RPLV mild, mild thoracid ao athero, subendo perfusion multiple segments in RCA territory\par -Cath 2019: distal LM 30%, prox LAD 70% s/p NAKITA prox LAD, LCx normal, mid RCA 50%, ef 50% w inf wall HK, edp 12, no as/mr\par -Nuclear 2019: submax 82% mphr, perfusion c/w prior inflat MI w mod periinfarct ischemia, mild TID\par -ekg ordered today - nsr, old infapical MI, nsra\par -labs ordered today\par -restart asa, statin, zetia \par -add toprol, ccb, ACE\par -refer to rodrigo for weight loss drug discussion\par -counseled on cvd risk factors\par -f/up 1 month for htn, cad\par \par I have spent 60 minutes reviewing labs, records, tests and discussed cvd risk fators, cad, htn, hl

## 2023-07-24 LAB
ALBUMIN SERPL ELPH-MCNC: 4.8 G/DL
ALP BLD-CCNC: 68 U/L
ALT SERPL-CCNC: 19 U/L
ANION GAP SERPL CALC-SCNC: 12 MMOL/L
APPEARANCE: CLEAR
AST SERPL-CCNC: 15 U/L
BACTERIA: NEGATIVE /HPF
BILIRUB SERPL-MCNC: 0.6 MG/DL
BILIRUBIN URINE: NEGATIVE
BLOOD URINE: NEGATIVE
BUN SERPL-MCNC: 14 MG/DL
CALCIUM SERPL-MCNC: 10.1 MG/DL
CAST: 1 /LPF
CHLORIDE SERPL-SCNC: 102 MMOL/L
CHOLEST SERPL-MCNC: 264 MG/DL
CO2 SERPL-SCNC: 26 MMOL/L
COLOR: YELLOW
CREAT SERPL-MCNC: 1 MG/DL
CREAT SPEC-SCNC: 153 MG/DL
EGFR: 83 ML/MIN/1.73M2
EPITHELIAL CELLS: 0 /HPF
ESTIMATED AVERAGE GLUCOSE: 114 MG/DL
GLUCOSE QUALITATIVE U: NEGATIVE MG/DL
GLUCOSE SERPL-MCNC: 102 MG/DL
HBA1C MFR BLD HPLC: 5.6 %
HDLC SERPL-MCNC: 46 MG/DL
KETONES URINE: NEGATIVE MG/DL
LDLC SERPL CALC-MCNC: 170 MG/DL
LEUKOCYTE ESTERASE URINE: NEGATIVE
MAGNESIUM SERPL-MCNC: 2.1 MG/DL
MICROALBUMIN 24H UR DL<=1MG/L-MCNC: 6.5 MG/DL
MICROALBUMIN/CREAT 24H UR-RTO: 42 MG/G
MICROSCOPIC-UA: NORMAL
NITRITE URINE: NEGATIVE
NONHDLC SERPL-MCNC: 218 MG/DL
PH URINE: 5.5
POTASSIUM SERPL-SCNC: 4.3 MMOL/L
PROT SERPL-MCNC: 7.4 G/DL
PROTEIN URINE: NORMAL MG/DL
RED BLOOD CELLS URINE: 1 /HPF
SODIUM SERPL-SCNC: 139 MMOL/L
SPECIFIC GRAVITY URINE: 1.02
TRIGL SERPL-MCNC: 257 MG/DL
TSH SERPL-ACNC: 3.34 UIU/ML
UROBILINOGEN URINE: 0.2 MG/DL
WHITE BLOOD CELLS URINE: 0 /HPF

## 2023-07-25 RX ORDER — NITROGLYCERIN 0.4 MG/1
0.4 TABLET SUBLINGUAL
Qty: 25 | Refills: 3 | Status: ACTIVE | COMMUNITY
Start: 2023-07-20 | End: 1900-01-01

## 2023-08-04 ENCOUNTER — APPOINTMENT (OUTPATIENT)
Dept: HEART AND VASCULAR | Facility: CLINIC | Age: 68
End: 2023-08-04
Payer: COMMERCIAL

## 2023-08-04 DIAGNOSIS — R07.89 OTHER CHEST PAIN: ICD-10-CM

## 2023-08-04 PROCEDURE — 93306 TTE W/DOPPLER COMPLETE: CPT

## 2023-08-17 PROBLEM — R07.89 CHEST TIGHTNESS: Status: ACTIVE | Noted: 2019-04-10

## 2023-08-23 NOTE — H&P ADULT - NSCORESITESY/N_GEN_A_CORE_RD
RN received call from Adele at [facility]    Patient has endoscopy scheduled but they have not received a referral.    RN advised it was faxed 8/21.    Adele calling to confirm an upper endoscopy is what is ordered.    RN reviewed referral and relayed what Dr. Gomez had ordered.  RN did advise referral had been faxed 8/21.    Adele did state that is does take a couple days for those to be processed.    Aj Montana, RN, BSN, PHN  [facility]  
No

## 2023-09-07 ENCOUNTER — APPOINTMENT (OUTPATIENT)
Dept: HEART AND VASCULAR | Facility: CLINIC | Age: 68
End: 2023-09-07
Payer: COMMERCIAL

## 2023-09-07 VITALS
BODY MASS INDEX: 35.93 KG/M2 | HEART RATE: 68 BPM | OXYGEN SATURATION: 97 % | DIASTOLIC BLOOD PRESSURE: 70 MMHG | HEIGHT: 70 IN | TEMPERATURE: 98.7 F | WEIGHT: 251 LBS | SYSTOLIC BLOOD PRESSURE: 160 MMHG

## 2023-09-07 DIAGNOSIS — E88.81 METABOLIC SYNDROME: ICD-10-CM

## 2023-09-07 PROCEDURE — 99215 OFFICE O/P EST HI 40 MIN: CPT

## 2023-09-07 RX ORDER — TIRZEPATIDE 2.5 MG/.5ML
2.5 INJECTION, SOLUTION SUBCUTANEOUS
Qty: 1 | Refills: 1 | Status: ACTIVE | COMMUNITY
Start: 2023-09-07 | End: 1900-01-01

## 2023-09-07 NOTE — HISTORY OF PRESENT ILLNESS
[FreeTextEntry1] : 67 y/o male with  h/o inferopost MI s/p TNK, s/p PCI RCA , cad s/p PCI  pLAD , obesity, htn, hl, family h/o early cvd who presents for weight management as a referral from Dr. Tam.  Patient feels his weight is negatively impacting his overall health, and with weight loss would come improved BP, cholesterol, etc; he likes the idea of taking as few meds as possible and feels that if weight loss meds would help him get off other meds, he would like to try it. His wife is taking Mounjaro and has had success.   He works as an  and is very busy; hard to find time to exercise. He knows he should, but doesn't feel it's very feasible with his schedule. Overall his diet is fair/average and could make improvements towards adding in more plant-based foods.    ==========================================================================================  notes some chest tightness once every few months - worse w stress nonradiating, 2/10, lasts minutes, no associated symptoms  no sob notes RHOADES  no syncope, lh, palpitations, edema, orthopnea, pnd   off all meds for past year   former patient of Dr. Aguilar - last seen   not exercising  diet not great no mental health issues  Stress Test: 2019, no Ischemia, 5:56, 79%mphr  Echo: 2016, enlarged LA size, mild mitral regurgitation LVEF 55%.  Cardiac Cath:  99% RCA s/p PCI RCA CTA 2019: LM prob significant >50%, prox/mid LAD severe, D1 moderate, prox LCx mild, OM1 mild, proxRCA mild, mid RCA stent, distal to RCA stent severe, dRCA severe, RPLV mild, mild thoracid ao athero, subendo perfusion multiple segments in RCA territory Cath 2019: distal LM 30%, prox LAD 70% s/p NAKITA prox LAD, LCx normal, mid RCA 50%, ef 50% w inf wall HK, edp 12, no as/mr Nuclear 2019: submax 82% mphr, perfusion c/w prior inflat MI w mod periinfarct ischemia, mild TID  PMH/PSH: inferopost MI s/p TNK, RCA PCI  s/p PCI LAD  obesity htn hl achilles tendinitis cataract spinal stenosis  ALL: crestor - dark urine  MEDS: off  (lipitor 80, zetia 10,  asa 81, lisinopril 40 mg, norvasc 10)  SH: no tobacco social etoh no drugs  from [facility]  2 children - 20's - healthy  FH: mother -  89, MI 60's, htn father -  89, MI 50's sister - alive, healthy, 70 4 - 1/2 sister (1  lung cancer)

## 2023-09-07 NOTE — PHYSICAL EXAM
[Well Developed] : well developed [Well Nourished] : well nourished [No Acute Distress] : no acute distress [Normal Conjunctiva] : normal conjunctiva [Normal Venous Pressure] : normal venous pressure [No Carotid Bruit] : no carotid bruit [Normal S1, S2] : normal S1, S2 [No Rub] : no rub [No Gallop] : no gallop [Clear Lung Fields] : clear lung fields [Good Air Entry] : good air entry [No Respiratory Distress] : no respiratory distress  [Soft] : abdomen soft [Non Tender] : non-tender [No Masses/organomegaly] : no masses/organomegaly [Normal Bowel Sounds] : normal bowel sounds [Normal Gait] : normal gait [No Edema] : no edema [No Cyanosis] : no cyanosis [No Clubbing] : no clubbing [No Varicosities] : no varicosities [No Rash] : no rash [No Skin Lesions] : no skin lesions [Moves all extremities] : moves all extremities [No Focal Deficits] : no focal deficits [Normal Speech] : normal speech [Alert and Oriented] : alert and oriented [Normal memory] : normal memory [de-identified] : 2/6 jourdan flores

## 2023-09-07 NOTE — DISCUSSION/SUMMARY
[FreeTextEntry1] : 67 y/o male with  h/o inferopost MI s/p TNK, s/p PCI RCA 2003, cad s/p PCI  pLAD 2019, obesity, htn, hl, family h/o early cvd who presents for weight management as a referral from Dr. Tam.  - Dietary and exercise habits reviewed and discussed with over 50% of the visit spent discussing cardiovascular disease, weight management and the optimization of risk factors and lifestyle strategies that can be used to achieve this. - The cardiometabolic benefits of GLP-1 agonists were discussed as well as the possible side effects, including injection site reaction, upset stomach, nausea, vomiting, diarrhea, as well as dizziness and headaches. Pancreatitis and kidney failure history were reviewed since caution should be taken in patients with a history of either condition. Patient denies a personal or family history of medullary thyroid cancer or MEN2. Advised that the risk of hypoglycemia with the drug class is rare, but does increase if combined with insulin or other oral diabetes medications.  - BMI obese at 36; meets criteria for metabolic syndrome although current A1c 5.6% w/ elevated TG, low HDL, HTN and increased abdominal fat  - exercise recommendations will be pending based on further testing ordered by Dr. Tam and in the meantime, dietary modifications discussed, including adding more plant-based foods in general and limiting saturated fats and added sugars/refined carbs  -will f/u w/ next steps depending insurance coverage for Mounjaro (avoid [employer] for now based on supply chain issues for new pts)  Pt will continue to follow w/ Dr. Tam

## 2023-09-13 ENCOUNTER — APPOINTMENT (OUTPATIENT)
Dept: HEART AND VASCULAR | Facility: CLINIC | Age: 68
End: 2023-09-13
Payer: COMMERCIAL

## 2023-09-13 VITALS
WEIGHT: 253 LBS | OXYGEN SATURATION: 98 % | HEIGHT: 70 IN | BODY MASS INDEX: 36.22 KG/M2 | SYSTOLIC BLOOD PRESSURE: 138 MMHG | TEMPERATURE: 97.6 F | HEART RATE: 67 BPM | DIASTOLIC BLOOD PRESSURE: 72 MMHG

## 2023-09-13 PROCEDURE — 99214 OFFICE O/P EST MOD 30 MIN: CPT | Mod: 25

## 2023-10-05 ENCOUNTER — APPOINTMENT (OUTPATIENT)
Dept: CT IMAGING | Facility: HOSPITAL | Age: 68
End: 2023-10-05
Payer: COMMERCIAL

## 2023-10-05 ENCOUNTER — OUTPATIENT (OUTPATIENT)
Dept: OUTPATIENT SERVICES | Facility: HOSPITAL | Age: 68
LOS: 1 days | End: 2023-10-05
Payer: COMMERCIAL

## 2023-10-05 ENCOUNTER — RESULT REVIEW (OUTPATIENT)
Age: 68
End: 2023-10-05

## 2023-10-05 PROCEDURE — 0502T: CPT

## 2023-10-05 PROCEDURE — 75574 CT ANGIO HRT W/3D IMAGE: CPT | Mod: 26

## 2023-10-05 PROCEDURE — 0503T: CPT

## 2023-10-05 PROCEDURE — 0504T: CPT

## 2023-10-05 PROCEDURE — 75574 CT ANGIO HRT W/3D IMAGE: CPT

## 2023-10-10 VITALS
HEIGHT: 70 IN | TEMPERATURE: 99 F | SYSTOLIC BLOOD PRESSURE: 147 MMHG | OXYGEN SATURATION: 95 % | DIASTOLIC BLOOD PRESSURE: 69 MMHG | RESPIRATION RATE: 18 BRPM | HEART RATE: 60 BPM | WEIGHT: 250 LBS

## 2023-10-10 RX ORDER — ISOSORBIDE MONONITRATE 60 MG/1
1 TABLET, EXTENDED RELEASE ORAL
Qty: 0 | Refills: 0 | DISCHARGE

## 2023-10-10 RX ORDER — CHLORHEXIDINE GLUCONATE 213 G/1000ML
1 SOLUTION TOPICAL ONCE
Refills: 0 | Status: DISCONTINUED | OUTPATIENT
Start: 2023-10-16 | End: 2023-10-16

## 2023-10-10 RX ORDER — AMLODIPINE BESYLATE 2.5 MG/1
1 TABLET ORAL
Qty: 0 | Refills: 0 | DISCHARGE

## 2023-10-10 NOTE — H&P ADULT - ADDITIONAL PE
ASA: II  Mallampati: II  12 Lead EKG: PENDING ASA: II  Mallampati: II  12 Lead EKG: Sinus Rhythm, Q waves inferiorly

## 2023-10-10 NOTE — H&P ADULT - NSICDXFAMILYHX_GEN_ALL_CORE_FT
FAMILY HISTORY:  Father  Still living? Unknown  FHx: myocardial infarction, Age at diagnosis: 61-70    Mother  Still living? Unknown  FHx: myocardial infarction, Age at diagnosis: 51-60

## 2023-10-10 NOTE — H&P ADULT - HISTORY OF PRESENT ILLNESS
Cardiologist: Yonatan  Pharmacy:   Escort:    67 yo male with hx of HTN, HLD, CAD (inferior posterior MI s/p TNK, s/p PCI to pLAD in 2019, s/p PCI RCA 2003) who notes some chest tightness every few months that worsens. Pt states  the chest pain is non radiating, 2/10 last minutes and has no associated symptoms. PT denies SOB or dyspnea on exertion. Currently, Pt ___ denies palpitations, orthopnea, PND, leg edema, n/v/d, fever, chills, hematochezia, hematemesis, melena, BRBPR and other symptoms.     CCTA 10/5/23: Moderate stenosis in the proximal RCA followed by an area of moderate to severe in the mid RCA. Calcific plauqe at the ostium of hte large branching D1 where significant stenosis cannot be excluded. Patent stent in the mid LAD. Less than 50% stenosis of the left main.     TTE 8/23 (per MD note): EF=66%. LV wall is moderately increased. Grade I LVDD. Normal RV cavity size and normal RV wall thickness. Normal atria. Elevated pulmonary systolic pressure is 30mmHg.     In light of pt's risk factors, CCS class ___ anginal/anginal equivalent symptoms, abnormal NST,  pt presents to [facility] for LHC with possible intervention if clinically indicated.  Cardiologist: Dr. Tam  Pharmacy: [facility] (In [place])  Escort: Family    67 yo male with hx of HTN, HLD, known CAD (inferior posterior MI s/p TNK, s/p PCI to pLAD in 2019, s/p PCI RCA 2003) presented to cardiologist Dr. Garcia with intermittent chest tightness every few months independent of activity.  Pt states  the chest pain is non radiating, 2/10 last minutes and has no associated symptoms.  Pt  denies palpitations, orthopnea, PND, leg edema, n/v/d, fever, chills, hematochezia, hematemesis, melena, BRBPR and other symptoms.     CCTA 10/5/23: Moderate stenosis in the proximal RCA followed by an area of moderate to severe in the mid RCA. Calcific plauqe at the ostium of hte large branching D1 where significant stenosis cannot be excluded. Patent stent in the mid LAD. Less than 50% stenosis of the left main.  CTA FFR revealed LAD: Not modelled due to presence of metallic stent. LCx: 0.76 mid  (2). RCA: 0.96 proximal (1); 0.82 mid (1). TTE 8/23 (per MD note): EF=66%. LV wall is moderately increased. Grade I LVDD. Normal RV cavity size and normal RV wall thickness. Normal atria. Elevated pulmonary systolic pressure is 30mmHg. In light of pt's risk factors, CCS Agnina Class IV Symptoms, and CCTA,  pt presents for cardiac catheterization with possible intervention if clinically indicated.  Cardiologist: Dr. Tam  Pharmacy: [facility] (In [place])  Escort: Family    67 yo male with hx of HTN, HLD, known CAD (inferior posterior MI s/p TNK, s/p PCI to pLAD in 2019, s/p PCI RCA 2003) presented to cardiologist Dr. Garcia with intermittent chest tightness every few months independent of activity.  Pt states  the chest pain is non radiating, 2/10 last minutes and has no associated symptoms.  Pt  denies palpitations, orthopnea, PND, leg edema, n/v/d, fever, chills, hematochezia, hematemesis, melena, BRBPR and other symptoms.     CCTA 10/5/23: Moderate stenosis in the proximal RCA followed by an area of moderate to severe in the mid RCA. Calcific plauqe at the ostium of hte large branching D1 where significant stenosis cannot be excluded. Patent stent in the mid LAD. Less than 50% stenosis of the left main.  CTA FFR revealed LAD: Not modelled due to presence of metallic stent. LCx: 0.76 mid  (2). RCA: 0.96 proximal (1); 0.82 mid (1). TTE 8/23 (per MD note): EF=66%. LV wall is moderately increased. Grade I LVDD. Normal RV cavity size and normal RV wall thickness. Normal atria. Elevated pulmonary systolic pressure is 30mmHg. In light of pt's risk factors, CCS Agnina Class IV Symptoms, and CCTA,  pt presents for cardiac catheterization with possible intervention if clinically indicated.

## 2023-10-10 NOTE — H&P ADULT - ASSESSMENT
69 yo male with hx of HTN, HLD, known CAD (inferior posterior MI s/p TNK, s/p PCI to pLAD in 2019, s/p PCI RCA 2003) presents for cardiac catheterization with possible intervention if clinically indicated due to pt's risk factors, CCS Agnina Class IV Symptoms, and CCTA,    Risks & benefits of procedure and alternative therapy have been explained to the patient including but not limited to: allergic reaction, bleeding w/possible need for blood transfusion, infection, renal and vascular compromise, limb damage, arrhythmia, stroke, vessel dissection/perforation, Myocardial infarction, emergent CABG. Informed consent obtained and in chart.    -H/H: ____. Pt denies BRBPR, hematuria, hematochezia, melena. Pt loaded 325mg ASA x1 and Plavix 600mg x1  -Cr: _____. EF normal. Euvolemic on exam.   bolus x one given per protocol followed by NS @ 75 cc/hour x two hours   -DM: Insulin Sliding Scale Coverage ordered  -Cardiac Catheterization ordered placed   -Home Medication Confirmed via: Medication bottles at bedside   -Type of sedation: Moderate  -Candidate for sedation: Yes  67 yo male with hx of HTN, HLD, known CAD (inferior posterior MI s/p TNK, s/p PCI to pLAD in 2019, s/p PCI RCA 2003) presents for cardiac catheterization with possible intervention if clinically indicated due to pt's risk factors, CCS Agnina Class IV Symptoms, and CCTA,    Risks & benefits of procedure and alternative therapy have been explained to the patient including but not limited to: allergic reaction, bleeding w/possible need for blood transfusion, infection, renal and vascular compromise, limb damage, arrhythmia, stroke, vessel dissection/perforation, Myocardial infarction, emergent CABG. Informed consent obtained and in chart.    -H/H: 14.6/42.0. Pt denies BRBPR, hematuria, hematochezia, melena.Took home ASA 81 mg this AM. Will administer 600 mg of Plavix  -Cr: 1.0.  EF normal. Euvolemic on exam.   bolus x one given per protocol followed by NS @ 75 cc/hour x two hours   -Cardiac Catheterization ordered placed   -Home Medication Confirmed via: Medication bottles at bedside   -Type of sedation: Moderate  -Candidate for sedation: Yes  69 yo male with hx of HTN, HLD, known CAD (inferior posterior MI s/p TNK, s/p PCI to pLAD in 2019, s/p PCI RCA 2003) presents for cardiac catheterization with possible intervention if clinically indicated due to pt's risk factors, CCS Agnina Class IV Symptoms, and CCTA,    Risks & benefits of procedure and alternative therapy have been explained to the patient including but not limited to: allergic reaction, bleeding w/possible need for blood transfusion, infection, renal and vascular compromise, limb damage, arrhythmia, stroke, vessel dissection/perforation, Myocardial infarction, emergent CABG. Informed consent obtained and in chart.    -H/H: 14.6/42.0. Pt denies BRBPR, hematuria, hematochezia, melena.Took home ASA 81 mg this AM. Will administer 600 mg of Plavix  -Cr: 1.0.  EF normal. Euvolemic on exam.   bolus x one given per protocol followed by NS @ 75 cc/hour x two hours   -Cardiac Catheterization ordered placed   -Home Medication Confirmed via: patient   -Type of sedation: Moderate  -Candidate for sedation: Yes

## 2023-10-13 RX ORDER — SODIUM CHLORIDE 9 MG/ML
500 INJECTION INTRAMUSCULAR; INTRAVENOUS; SUBCUTANEOUS
Refills: 0 | Status: DISCONTINUED | OUTPATIENT
Start: 2023-10-16 | End: 2023-10-16

## 2023-10-16 ENCOUNTER — APPOINTMENT (OUTPATIENT)
Dept: HEART AND VASCULAR | Facility: CLINIC | Age: 68
End: 2023-10-16

## 2023-10-16 ENCOUNTER — INPATIENT (INPATIENT)
Facility: HOSPITAL | Age: 68
LOS: 0 days | Discharge: HOME | DRG: 324 | End: 2023-10-17
Attending: INTERNAL MEDICINE | Admitting: INTERNAL MEDICINE
Payer: COMMERCIAL

## 2023-10-16 ENCOUNTER — TRANSCRIPTION ENCOUNTER (OUTPATIENT)
Age: 68
End: 2023-10-16

## 2023-10-16 LAB
A1C WITH ESTIMATED AVERAGE GLUCOSE RESULT: 5.7 % — HIGH (ref 4–5.6)
ALBUMIN SERPL ELPH-MCNC: 4.8 G/DL — SIGNIFICANT CHANGE UP (ref 3.3–5)
ALP SERPL-CCNC: 68 U/L — SIGNIFICANT CHANGE UP (ref 40–120)
ALT FLD-CCNC: 22 U/L — SIGNIFICANT CHANGE UP (ref 10–45)
ANION GAP SERPL CALC-SCNC: 11 MMOL/L — SIGNIFICANT CHANGE UP (ref 5–17)
APTT BLD: 32.8 SEC — SIGNIFICANT CHANGE UP (ref 24.5–35.6)
AST SERPL-CCNC: 17 U/L — SIGNIFICANT CHANGE UP (ref 10–40)
BASOPHILS # BLD AUTO: 0.03 K/UL — SIGNIFICANT CHANGE UP (ref 0–0.2)
BASOPHILS NFR BLD AUTO: 0.4 % — SIGNIFICANT CHANGE UP (ref 0–2)
BILIRUB SERPL-MCNC: 1 MG/DL — SIGNIFICANT CHANGE UP (ref 0.2–1.2)
BUN SERPL-MCNC: 16 MG/DL — SIGNIFICANT CHANGE UP (ref 7–23)
CALCIUM SERPL-MCNC: 9.9 MG/DL — SIGNIFICANT CHANGE UP (ref 8.4–10.5)
CHLORIDE SERPL-SCNC: 102 MMOL/L — SIGNIFICANT CHANGE UP (ref 96–108)
CHOLEST SERPL-MCNC: 133 MG/DL — SIGNIFICANT CHANGE UP
CK MB CFR SERPL CALC: 3 NG/ML — SIGNIFICANT CHANGE UP (ref 0–6.7)
CK SERPL-CCNC: 133 U/L — SIGNIFICANT CHANGE UP (ref 30–200)
CO2 SERPL-SCNC: 28 MMOL/L — SIGNIFICANT CHANGE UP (ref 22–31)
CREAT SERPL-MCNC: 1 MG/DL — SIGNIFICANT CHANGE UP (ref 0.5–1.3)
EGFR: 83 ML/MIN/1.73M2 — SIGNIFICANT CHANGE UP
EOSINOPHIL # BLD AUTO: 0.04 K/UL — SIGNIFICANT CHANGE UP (ref 0–0.5)
EOSINOPHIL NFR BLD AUTO: 0.6 % — SIGNIFICANT CHANGE UP (ref 0–6)
ESTIMATED AVERAGE GLUCOSE: 117 MG/DL — HIGH (ref 68–114)
GLUCOSE SERPL-MCNC: 111 MG/DL — HIGH (ref 70–99)
HCT VFR BLD CALC: 42 % — SIGNIFICANT CHANGE UP (ref 39–50)
HDLC SERPL-MCNC: 48 MG/DL — SIGNIFICANT CHANGE UP
HGB BLD-MCNC: 14.6 G/DL — SIGNIFICANT CHANGE UP (ref 13–17)
IMM GRANULOCYTES NFR BLD AUTO: 0.3 % — SIGNIFICANT CHANGE UP (ref 0–0.9)
INR BLD: 1.01 — SIGNIFICANT CHANGE UP (ref 0.85–1.18)
LIPID PNL WITH DIRECT LDL SERPL: 53 MG/DL — SIGNIFICANT CHANGE UP
LYMPHOCYTES # BLD AUTO: 1.33 K/UL — SIGNIFICANT CHANGE UP (ref 1–3.3)
LYMPHOCYTES # BLD AUTO: 19.8 % — SIGNIFICANT CHANGE UP (ref 13–44)
MAGNESIUM SERPL-MCNC: 1.9 MG/DL — SIGNIFICANT CHANGE UP (ref 1.6–2.6)
MCHC RBC-ENTMCNC: 31.9 PG — SIGNIFICANT CHANGE UP (ref 27–34)
MCHC RBC-ENTMCNC: 34.8 GM/DL — SIGNIFICANT CHANGE UP (ref 32–36)
MCV RBC AUTO: 91.7 FL — SIGNIFICANT CHANGE UP (ref 80–100)
MONOCYTES # BLD AUTO: 0.55 K/UL — SIGNIFICANT CHANGE UP (ref 0–0.9)
MONOCYTES NFR BLD AUTO: 8.2 % — SIGNIFICANT CHANGE UP (ref 2–14)
NEUTROPHILS # BLD AUTO: 4.75 K/UL — SIGNIFICANT CHANGE UP (ref 1.8–7.4)
NEUTROPHILS NFR BLD AUTO: 70.7 % — SIGNIFICANT CHANGE UP (ref 43–77)
NON HDL CHOLESTEROL: 85 MG/DL — SIGNIFICANT CHANGE UP
NRBC # BLD: 0 /100 WBCS — SIGNIFICANT CHANGE UP (ref 0–0)
PLATELET # BLD AUTO: 188 K/UL — SIGNIFICANT CHANGE UP (ref 150–400)
POTASSIUM SERPL-MCNC: 3.9 MMOL/L — SIGNIFICANT CHANGE UP (ref 3.5–5.3)
POTASSIUM SERPL-SCNC: 3.9 MMOL/L — SIGNIFICANT CHANGE UP (ref 3.5–5.3)
PROT SERPL-MCNC: 7.7 G/DL — SIGNIFICANT CHANGE UP (ref 6–8.3)
PROTHROM AB SERPL-ACNC: 11.5 SEC — SIGNIFICANT CHANGE UP (ref 9.5–13)
RBC # BLD: 4.58 M/UL — SIGNIFICANT CHANGE UP (ref 4.2–5.8)
RBC # FLD: 12.3 % — SIGNIFICANT CHANGE UP (ref 10.3–14.5)
SODIUM SERPL-SCNC: 141 MMOL/L — SIGNIFICANT CHANGE UP (ref 135–145)
TRIGL SERPL-MCNC: 159 MG/DL — HIGH
WBC # BLD: 6.72 K/UL — SIGNIFICANT CHANGE UP (ref 3.8–10.5)
WBC # FLD AUTO: 6.72 K/UL — SIGNIFICANT CHANGE UP (ref 3.8–10.5)

## 2023-10-16 PROCEDURE — 93458 L HRT ARTERY/VENTRICLE ANGIO: CPT | Mod: 26,59

## 2023-10-16 PROCEDURE — 99223 1ST HOSP IP/OBS HIGH 75: CPT

## 2023-10-16 PROCEDURE — 0715T: CPT

## 2023-10-16 PROCEDURE — 93571 IV DOP VEL&/PRESS C FLO 1ST: CPT | Mod: 26,52,LD

## 2023-10-16 PROCEDURE — 92928 PRQ TCAT PLMT NTRAC ST 1 LES: CPT | Mod: RC

## 2023-10-16 RX ORDER — METOPROLOL TARTRATE 50 MG
50 TABLET ORAL DAILY
Refills: 0 | Status: DISCONTINUED | OUTPATIENT
Start: 2023-10-16 | End: 2023-10-17

## 2023-10-16 RX ORDER — ASPIRIN/CALCIUM CARB/MAGNESIUM 324 MG
81 TABLET ORAL ONCE
Refills: 0 | Status: COMPLETED | OUTPATIENT
Start: 2023-10-16 | End: 2023-10-16

## 2023-10-16 RX ORDER — CLOPIDOGREL BISULFATE 75 MG/1
75 TABLET, FILM COATED ORAL DAILY
Refills: 0 | Status: DISCONTINUED | OUTPATIENT
Start: 2023-10-17 | End: 2023-10-17

## 2023-10-16 RX ORDER — LISINOPRIL 2.5 MG/1
40 TABLET ORAL DAILY
Refills: 0 | Status: DISCONTINUED | OUTPATIENT
Start: 2023-10-17 | End: 2023-10-17

## 2023-10-16 RX ORDER — METOPROLOL TARTRATE 50 MG
1 TABLET ORAL
Refills: 0 | DISCHARGE

## 2023-10-16 RX ORDER — LISINOPRIL 2.5 MG/1
1 TABLET ORAL
Qty: 0 | Refills: 0 | DISCHARGE

## 2023-10-16 RX ORDER — AMLODIPINE BESYLATE 2.5 MG/1
10 TABLET ORAL DAILY
Refills: 0 | Status: DISCONTINUED | OUTPATIENT
Start: 2023-10-17 | End: 2023-10-17

## 2023-10-16 RX ORDER — TIRZEPATIDE 15 MG/.5ML
2.5 INJECTION, SOLUTION SUBCUTANEOUS
Refills: 0 | DISCHARGE

## 2023-10-16 RX ORDER — AMLODIPINE BESYLATE 2.5 MG/1
1 TABLET ORAL
Refills: 0 | DISCHARGE

## 2023-10-16 RX ORDER — ASPIRIN/CALCIUM CARB/MAGNESIUM 324 MG
81 TABLET ORAL DAILY
Refills: 0 | Status: DISCONTINUED | OUTPATIENT
Start: 2023-10-17 | End: 2023-10-17

## 2023-10-16 RX ORDER — ATORVASTATIN CALCIUM 80 MG/1
80 TABLET, FILM COATED ORAL AT BEDTIME
Refills: 0 | Status: DISCONTINUED | OUTPATIENT
Start: 2023-10-16 | End: 2023-10-17

## 2023-10-16 RX ORDER — EZETIMIBE 10 MG/1
1 TABLET ORAL
Qty: 0 | Refills: 0 | DISCHARGE

## 2023-10-16 RX ORDER — ATORVASTATIN CALCIUM 80 MG/1
1 TABLET, FILM COATED ORAL
Qty: 0 | Refills: 0 | DISCHARGE

## 2023-10-16 RX ORDER — SODIUM CHLORIDE 9 MG/ML
250 INJECTION INTRAMUSCULAR; INTRAVENOUS; SUBCUTANEOUS ONCE
Refills: 0 | Status: COMPLETED | OUTPATIENT
Start: 2023-10-16 | End: 2023-10-16

## 2023-10-16 RX ORDER — CLOPIDOGREL BISULFATE 75 MG/1
600 TABLET, FILM COATED ORAL ONCE
Refills: 0 | Status: COMPLETED | OUTPATIENT
Start: 2023-10-16 | End: 2023-10-16

## 2023-10-16 RX ADMIN — CLOPIDOGREL BISULFATE 600 MILLIGRAM(S): 75 TABLET, FILM COATED ORAL at 11:28

## 2023-10-16 RX ADMIN — SODIUM CHLORIDE 75 MILLILITER(S): 9 INJECTION INTRAMUSCULAR; INTRAVENOUS; SUBCUTANEOUS at 11:31

## 2023-10-16 RX ADMIN — SODIUM CHLORIDE 75 MILLILITER(S): 9 INJECTION INTRAMUSCULAR; INTRAVENOUS; SUBCUTANEOUS at 11:30

## 2023-10-16 RX ADMIN — SODIUM CHLORIDE 500 MILLILITER(S): 9 INJECTION INTRAMUSCULAR; INTRAVENOUS; SUBCUTANEOUS at 13:08

## 2023-10-16 NOTE — DISCHARGE NOTE PROVIDER - HOSPITAL COURSE
68-year-old male with PMHx of HTN, HLD, known CAD (inferior posterior MI s/p TNK, s/p PCI to pLAD in 2019, s/p PCI RCA 2003) who is referred to [facility] for cardiac catheterization with possible intervention if clinically indicated in light of pt's risk factors, CCS anginal Class IV Symptoms, and abnormal CCTA.    Pt now s/p cardiac catheterization (10/16/23): ________________ access ____. Pt admitted overnight for observation and telemetry monitoring. Pt seen and examined at bedside this AM without any complaints or events overnight, VSS, labs and telemetry reviewed and pt stable for discharge as discussed with  ___. Pt has received appropriate discharge instructions, including medication regimen, access site management and follow up with Dr. Jose Tam in 1-2 weeks.    Discharge medications: ASA 81mg QD, Plavix 75mg QD, Atorvastatin 80 mg QHS, Lisinopril 40 mg QD, Toprol XL 50 mg QD, Norvasc 10 mg QD, Zetia 10 mg QHS ______________.    Cardiac Rehab (Post PCI): Education on benefits of Cardiac Rehab provided to patient: Yes, Referral and Prescription Given for Cardiac Rehab: Yes, Pt given list of locations & instructed to contact their insurance company to review list of participating providers. 68-year-old male with PMHx of HTN, HLD, known CAD (inferior posterior MI s/p TNK, s/p PCI to pLAD in 2019, s/p PCI RCA 2003) who is referred to [facility] for cardiac catheterization with possible intervention if clinically indicated in light of pt's risk factors, CCS anginal Class IV Symptoms, and abnormal CCTA.    Patient is now s/p cardiac catheterization (10/16/23): shockwave/NAKITA p/m RCA (severely calcified 95% w/ mild thrombosis). Other findings: LM LI, pLAD stent w/ mild 30% ISR iFR neg, mLAD bridging, LCX LI, large OM1, RPL/RPDA diffuse 20-30% disease, EDP 9mmHg, EF 66% by ECHO. Access: right radial. Pt admitted overnight for observation and telemetry monitoring. Pt seen and examined at bedside this AM without any complaints or events overnight, VSS, labs and telemetry reviewed and pt stable for discharge as discussed with  ___. Pt has received appropriate discharge instructions, including medication regimen, access site management and follow up with Dr. Jose Tam in 1-2 weeks.    Discharge medications: ASA 81mg QD, Plavix 75mg QD, Atorvastatin 80 mg QHS, Lisinopril 40 mg QD, Toprol XL 50 mg QD, Norvasc 10 mg QD, Zetia 10 mg QHS ______________.    Cardiac Rehab (Post PCI): Education on benefits of Cardiac Rehab provided to patient: Yes, Referral and Prescription Given for Cardiac Rehab: Yes, Pt given list of locations & instructed to contact their insurance company to review list of participating providers. 68-year-old male with PMHx of HTN, HLD, known CAD (inferior posterior MI s/p TNK, s/p PCI to pLAD in 2019, s/p PCI RCA 2003) who is referred to [facility] for cardiac catheterization with possible intervention if clinically indicated in light of pt's risk factors, CCS anginal Class IV Symptoms, and abnormal CCTA.    Patient is now s/p cardiac catheterization (10/16/23): shockwave/NAKITA p/m RCA (severely calcified 95% w/ mild thrombosis). Other findings: LM LI, pLAD stent w/ mild 30% ISR iFR neg, mLAD bridging, LCX LI, large OM1, RPL/RPDA diffuse 20-30% disease, EDP 9mmHg, EF 66% by ECHO. Access: right radial. Pt admitted overnight for observation and telemetry monitoring. Pt seen and examined at bedside this AM without any complaints or events overnight, VSS, labs and telemetry reviewed and pt stable for discharge as discussed with Dr. Terry. Pt has received appropriate discharge instructions, including medication regimen, access site management and follow up with Dr. Jose Tam in 1-2 weeks.    Discharge medications: ASA 81mg QD, Plavix 75mg QD, Atorvastatin 80 mg QHS, Lisinopril 40 mg QD, Toprol XL 50 mg QD, Norvasc 10 mg QD, Zetia 10 mg QHS ______________.    Cardiac Rehab (Post PCI): Education on benefits of Cardiac Rehab provided to patient: Yes, Referral and Prescription Given for Cardiac Rehab: Yes, Pt given list of locations & instructed to contact their insurance company to review list of participating providers. 68-year-old male with PMHx of HTN, HLD, known CAD (inferior posterior MI s/p TNK, s/p PCI to pLAD in 2019, s/p PCI RCA 2003) who is referred to [facility] for cardiac catheterization with possible intervention if clinically indicated in light of pt's risk factors, CCS anginal Class IV Symptoms, and abnormal CCTA.    Patient is now s/p cardiac catheterization (10/16/23): shockwave/NAKITA p/m RCA (severely calcified 95% w/ mild thrombosis). Other findings: LM LI, pLAD stent w/ mild 30% ISR iFR neg, mLAD bridging, LCX LI, large OM1, RPL/RPDA diffuse 20-30% disease, EDP 9mmHg, EF 66% by ECHO. Access: right radial. Pt admitted overnight for observation and telemetry monitoring. Pt seen and examined at bedside this AM without any complaints or events overnight, VSS, labs and telemetry reviewed and pt stable for discharge as discussed with Dr. Terry. Pt has received appropriate discharge instructions, including medication regimen, access site management and follow up with Dr. Jose Tam in 1-2 weeks.    Discharge medications: ASA 81mg QD, Plavix 75mg QD, Atorvastatin 80 mg QHS, Lisinopril 40 mg QD, Toprol XL 50 mg QD, Norvasc 10 mg QD, Zetia 10 mg QHS.     Cardiac Rehab (Post PCI): Education on benefits of Cardiac Rehab provided to patient: Yes, Referral and Prescription Given for Cardiac Rehab: Yes, Pt given list of locations & instructed to contact their insurance company to review list of participating providers.

## 2023-10-16 NOTE — DISCHARGE NOTE PROVIDER - NSDCFUSCHEDAPPT_GEN_ALL_CORE_FT
Genesis Benavides Physician [facility]  [facility] [address]  Scheduled Appointment: 12/14/2023

## 2023-10-16 NOTE — DISCHARGE NOTE PROVIDER - CARE PROVIDER_API CALL
Gila Tam  Cardiology  [address], [address]  [place], [place] [address]  Phone: [phone]  Fax: [phone]  Established Patient  Follow Up Time: 1 week

## 2023-10-16 NOTE — DISCHARGE NOTE PROVIDER - ATTENDING DISCHARGE PHYSICAL EXAMINATION:
Agree with above  VSS  SAM  CTA b/l  S1S2 of normal intensity; RRR  Abd is soft nt nd bs present  No edema appreciated. Access site is clean/dry/intact

## 2023-10-16 NOTE — DISCHARGE NOTE PROVIDER - NSDCMRMEDTOKEN_GEN_ALL_CORE_FT
aspirin 81 mg oral tablet: 1 tab(s) orally once a day  atorvastatin 80 mg oral tablet: 1 tab(s) orally once a day  lisinopril 40 mg oral tablet: 1 tab(s) orally once a day  metoprolol succinate 50 mg oral capsule, extended release: 1 cap(s) orally once a day  Norvasc 10 mg oral tablet: 1 tab(s) orally once a day  Zetia 10 mg oral tablet: 1 tab(s) orally once a day   aspirin 81 mg oral tablet: 1 tab(s) orally once a day  atorvastatin 80 mg oral tablet: 1 tab(s) orally once a day  cardiac rehab, 3x/ week x 12 weeks for diagnosis of CAD. Cardiologist Dr. Tam: cardiac rehab, 3x/ week x 12 weeks for diagnosis of CAD. Cardiologist Dr. Tam  lisinopril 40 mg oral tablet: 1 tab(s) orally once a day  metoprolol succinate 50 mg oral capsule, extended release: 1 cap(s) orally once a day  Norvasc 10 mg oral tablet: 1 tab(s) orally once a day  Zetia 10 mg oral tablet: 1 tab(s) orally once a day   aspirin 81 mg oral tablet: 1 tab(s) orally once a day  atorvastatin 80 mg oral tablet: 1 tab(s) orally once a day  cardiac rehab, 3x/ week x 12 weeks for diagnosis of CAD. Cardiologist Dr. Tam: cardiac rehab, 3x/ week x 12 weeks for diagnosis of CAD. Cardiologist Dr. Tam  clopidogrel 75 mg oral tablet: 1 tab(s) orally once a day  lisinopril 40 mg oral tablet: 1 tab(s) orally once a day  metoprolol succinate 50 mg oral capsule, extended release: 1 cap(s) orally once a day  Norvasc 10 mg oral tablet: 1 tab(s) orally once a day  Zetia 10 mg oral tablet: 1 tab(s) orally once a day

## 2023-10-17 ENCOUNTER — TRANSCRIPTION ENCOUNTER (OUTPATIENT)
Age: 68
End: 2023-10-17

## 2023-10-17 VITALS
HEART RATE: 52 BPM | OXYGEN SATURATION: 98 % | DIASTOLIC BLOOD PRESSURE: 73 MMHG | SYSTOLIC BLOOD PRESSURE: 123 MMHG | RESPIRATION RATE: 18 BRPM | TEMPERATURE: 98 F

## 2023-10-17 LAB
ANION GAP SERPL CALC-SCNC: 10 MMOL/L — SIGNIFICANT CHANGE UP (ref 5–17)
ANION GAP SERPL CALC-SCNC: 10 MMOL/L — SIGNIFICANT CHANGE UP (ref 5–17)
BUN SERPL-MCNC: 13 MG/DL — SIGNIFICANT CHANGE UP (ref 7–23)
BUN SERPL-MCNC: 13 MG/DL — SIGNIFICANT CHANGE UP (ref 7–23)
CALCIUM SERPL-MCNC: 8.9 MG/DL — SIGNIFICANT CHANGE UP (ref 8.4–10.5)
CALCIUM SERPL-MCNC: 8.9 MG/DL — SIGNIFICANT CHANGE UP (ref 8.4–10.5)
CHLORIDE SERPL-SCNC: 106 MMOL/L — SIGNIFICANT CHANGE UP (ref 96–108)
CHLORIDE SERPL-SCNC: 106 MMOL/L — SIGNIFICANT CHANGE UP (ref 96–108)
CO2 SERPL-SCNC: 25 MMOL/L — SIGNIFICANT CHANGE UP (ref 22–31)
CO2 SERPL-SCNC: 25 MMOL/L — SIGNIFICANT CHANGE UP (ref 22–31)
CREAT SERPL-MCNC: 0.95 MG/DL — SIGNIFICANT CHANGE UP (ref 0.5–1.3)
CREAT SERPL-MCNC: 0.95 MG/DL — SIGNIFICANT CHANGE UP (ref 0.5–1.3)
EGFR: 88 ML/MIN/1.73M2 — SIGNIFICANT CHANGE UP
EGFR: 88 ML/MIN/1.73M2 — SIGNIFICANT CHANGE UP
GLUCOSE SERPL-MCNC: 109 MG/DL — HIGH (ref 70–99)
GLUCOSE SERPL-MCNC: 109 MG/DL — HIGH (ref 70–99)
HCT VFR BLD CALC: 36.1 % — LOW (ref 39–50)
HCT VFR BLD CALC: 36.1 % — LOW (ref 39–50)
HGB BLD-MCNC: 12.8 G/DL — LOW (ref 13–17)
HGB BLD-MCNC: 12.8 G/DL — LOW (ref 13–17)
MAGNESIUM SERPL-MCNC: 2 MG/DL — SIGNIFICANT CHANGE UP (ref 1.6–2.6)
MAGNESIUM SERPL-MCNC: 2 MG/DL — SIGNIFICANT CHANGE UP (ref 1.6–2.6)
MCHC RBC-ENTMCNC: 32.6 PG — SIGNIFICANT CHANGE UP (ref 27–34)
MCHC RBC-ENTMCNC: 32.6 PG — SIGNIFICANT CHANGE UP (ref 27–34)
MCHC RBC-ENTMCNC: 35.5 GM/DL — SIGNIFICANT CHANGE UP (ref 32–36)
MCHC RBC-ENTMCNC: 35.5 GM/DL — SIGNIFICANT CHANGE UP (ref 32–36)
MCV RBC AUTO: 91.9 FL — SIGNIFICANT CHANGE UP (ref 80–100)
MCV RBC AUTO: 91.9 FL — SIGNIFICANT CHANGE UP (ref 80–100)
NRBC # BLD: 0 /100 WBCS — SIGNIFICANT CHANGE UP (ref 0–0)
NRBC # BLD: 0 /100 WBCS — SIGNIFICANT CHANGE UP (ref 0–0)
PLATELET # BLD AUTO: 155 K/UL — SIGNIFICANT CHANGE UP (ref 150–400)
PLATELET # BLD AUTO: 155 K/UL — SIGNIFICANT CHANGE UP (ref 150–400)
POTASSIUM SERPL-MCNC: 4 MMOL/L — SIGNIFICANT CHANGE UP (ref 3.5–5.3)
POTASSIUM SERPL-MCNC: 4 MMOL/L — SIGNIFICANT CHANGE UP (ref 3.5–5.3)
POTASSIUM SERPL-SCNC: 4 MMOL/L — SIGNIFICANT CHANGE UP (ref 3.5–5.3)
POTASSIUM SERPL-SCNC: 4 MMOL/L — SIGNIFICANT CHANGE UP (ref 3.5–5.3)
RBC # BLD: 3.93 M/UL — LOW (ref 4.2–5.8)
RBC # BLD: 3.93 M/UL — LOW (ref 4.2–5.8)
RBC # FLD: 12.2 % — SIGNIFICANT CHANGE UP (ref 10.3–14.5)
RBC # FLD: 12.2 % — SIGNIFICANT CHANGE UP (ref 10.3–14.5)
SODIUM SERPL-SCNC: 141 MMOL/L — SIGNIFICANT CHANGE UP (ref 135–145)
SODIUM SERPL-SCNC: 141 MMOL/L — SIGNIFICANT CHANGE UP (ref 135–145)
WBC # BLD: 6.61 K/UL — SIGNIFICANT CHANGE UP (ref 3.8–10.5)
WBC # BLD: 6.61 K/UL — SIGNIFICANT CHANGE UP (ref 3.8–10.5)
WBC # FLD AUTO: 6.61 K/UL — SIGNIFICANT CHANGE UP (ref 3.8–10.5)
WBC # FLD AUTO: 6.61 K/UL — SIGNIFICANT CHANGE UP (ref 3.8–10.5)

## 2023-10-17 PROCEDURE — C1874: CPT

## 2023-10-17 PROCEDURE — 82550 ASSAY OF CK (CPK): CPT

## 2023-10-17 PROCEDURE — 83036 HEMOGLOBIN GLYCOSYLATED A1C: CPT

## 2023-10-17 PROCEDURE — 85730 THROMBOPLASTIN TIME PARTIAL: CPT

## 2023-10-17 PROCEDURE — 80053 COMPREHEN METABOLIC PANEL: CPT

## 2023-10-17 PROCEDURE — 82553 CREATINE MB FRACTION: CPT

## 2023-10-17 PROCEDURE — C1894: CPT

## 2023-10-17 PROCEDURE — 80061 LIPID PANEL: CPT

## 2023-10-17 PROCEDURE — 85610 PROTHROMBIN TIME: CPT

## 2023-10-17 PROCEDURE — C1725: CPT

## 2023-10-17 PROCEDURE — 99238 HOSP IP/OBS DSCHRG MGMT 30/<: CPT

## 2023-10-17 PROCEDURE — 85025 COMPLETE CBC W/AUTO DIFF WBC: CPT

## 2023-10-17 PROCEDURE — C1769: CPT

## 2023-10-17 PROCEDURE — 80048 BASIC METABOLIC PNL TOTAL CA: CPT

## 2023-10-17 PROCEDURE — 93005 ELECTROCARDIOGRAM TRACING: CPT

## 2023-10-17 PROCEDURE — C1761: CPT

## 2023-10-17 PROCEDURE — 83735 ASSAY OF MAGNESIUM: CPT

## 2023-10-17 PROCEDURE — 93010 ELECTROCARDIOGRAM REPORT: CPT

## 2023-10-17 PROCEDURE — 85027 COMPLETE CBC AUTOMATED: CPT

## 2023-10-17 PROCEDURE — C1887: CPT

## 2023-10-17 RX ORDER — ASPIRIN/CALCIUM CARB/MAGNESIUM 324 MG
1 TABLET ORAL
Qty: 0 | Refills: 0 | DISCHARGE

## 2023-10-17 RX ORDER — CLOPIDOGREL BISULFATE 75 MG/1
1 TABLET, FILM COATED ORAL
Qty: 90 | Refills: 11
Start: 2023-10-17 | End: 2026-09-30

## 2023-10-17 RX ORDER — ASPIRIN/CALCIUM CARB/MAGNESIUM 324 MG
1 TABLET ORAL
Qty: 90 | Refills: 11
Start: 2023-10-17 | End: 2026-09-30

## 2023-10-17 RX ADMIN — LISINOPRIL 40 MILLIGRAM(S): 2.5 TABLET ORAL at 05:21

## 2023-10-17 RX ADMIN — ATORVASTATIN CALCIUM 80 MILLIGRAM(S): 80 TABLET, FILM COATED ORAL at 05:22

## 2023-10-17 RX ADMIN — CLOPIDOGREL BISULFATE 75 MILLIGRAM(S): 75 TABLET, FILM COATED ORAL at 07:42

## 2023-10-17 RX ADMIN — AMLODIPINE BESYLATE 10 MILLIGRAM(S): 2.5 TABLET ORAL at 05:21

## 2023-10-17 RX ADMIN — Medication 81 MILLIGRAM(S): at 07:42

## 2023-10-17 RX ADMIN — Medication 50 MILLIGRAM(S): at 05:21

## 2023-10-17 NOTE — DISCHARGE NOTE NURSING/CASE MANAGEMENT/SOCIAL WORK - PATIENT PORTAL LINK FT
You can access the FollowMyHealth Patient Portal offered by [facility] by registering at the following website: [email or web address]. By joining [facility]’s FollowMyHealth portal, you will also be able to view your health information using other applications (apps) compatible with our system.

## 2023-10-17 NOTE — DISCHARGE NOTE NURSING/CASE MANAGEMENT/SOCIAL WORK - NSDCPEFALRISK_GEN_ALL_CORE
For information on Fall & Injury Prevention, visit: [email or web address] OR  [email or web address] OR  https://www.cdc.gov/steadi/patient.html

## 2023-10-24 DIAGNOSIS — I25.84 CORONARY ATHEROSCLEROSIS DUE TO CALCIFIED CORONARY LESION: ICD-10-CM

## 2023-10-24 DIAGNOSIS — I25.10 ATHEROSCLEROTIC HEART DISEASE OF NATIVE CORONARY ARTERY WITHOUT ANGINA PECTORIS: ICD-10-CM

## 2023-10-24 DIAGNOSIS — E66.9 OBESITY, UNSPECIFIED: ICD-10-CM

## 2023-10-24 DIAGNOSIS — Z95.5 PRESENCE OF CORONARY ANGIOPLASTY IMPLANT AND GRAFT: ICD-10-CM

## 2023-10-24 DIAGNOSIS — I10 ESSENTIAL (PRIMARY) HYPERTENSION: ICD-10-CM

## 2023-10-24 DIAGNOSIS — E78.5 HYPERLIPIDEMIA, UNSPECIFIED: ICD-10-CM

## 2023-10-24 DIAGNOSIS — I25.2 OLD MYOCARDIAL INFARCTION: ICD-10-CM

## 2023-10-24 DIAGNOSIS — Z79.82 LONG TERM (CURRENT) USE OF ASPIRIN: ICD-10-CM

## 2023-12-14 ENCOUNTER — APPOINTMENT (OUTPATIENT)
Dept: HEART AND VASCULAR | Facility: CLINIC | Age: 68
End: 2023-12-14

## 2024-01-19 ENCOUNTER — RX RENEWAL (OUTPATIENT)
Age: 69
End: 2024-01-19

## 2024-01-19 RX ORDER — ATORVASTATIN CALCIUM 80 MG/1
80 TABLET, FILM COATED ORAL
Qty: 90 | Refills: 1 | Status: ACTIVE | COMMUNITY
Start: 2023-07-20 | End: 1900-01-01

## 2024-05-29 PROBLEM — E66.9 OBESITY, UNSPECIFIED: Chronic | Status: ACTIVE | Noted: 2023-10-10

## 2024-05-29 PROBLEM — M76.60 ACHILLES TENDINITIS, UNSPECIFIED LEG: Chronic | Status: ACTIVE | Noted: 2023-10-10

## 2024-05-30 ENCOUNTER — APPOINTMENT (OUTPATIENT)
Dept: HEART AND VASCULAR | Facility: CLINIC | Age: 69
End: 2024-05-30
Payer: COMMERCIAL

## 2024-05-30 VITALS
HEART RATE: 58 BPM | OXYGEN SATURATION: 98 % | HEIGHT: 70 IN | SYSTOLIC BLOOD PRESSURE: 179 MMHG | DIASTOLIC BLOOD PRESSURE: 86 MMHG | TEMPERATURE: 98 F | WEIGHT: 252 LBS | BODY MASS INDEX: 36.08 KG/M2

## 2024-05-30 VITALS — DIASTOLIC BLOOD PRESSURE: 87 MMHG | SYSTOLIC BLOOD PRESSURE: 169 MMHG

## 2024-05-30 PROCEDURE — 36415 COLL VENOUS BLD VENIPUNCTURE: CPT

## 2024-05-30 PROCEDURE — 99214 OFFICE O/P EST MOD 30 MIN: CPT | Mod: 25

## 2024-05-30 PROCEDURE — 93000 ELECTROCARDIOGRAM COMPLETE: CPT

## 2024-05-30 RX ORDER — AMLODIPINE BESYLATE 10 MG/1
10 TABLET ORAL
Qty: 90 | Refills: 1 | Status: ACTIVE | COMMUNITY
Start: 2018-12-21 | End: 1900-01-01

## 2024-05-30 RX ORDER — EZETIMIBE 10 MG/1
10 TABLET ORAL DAILY
Qty: 90 | Refills: 3 | Status: ACTIVE | COMMUNITY
Start: 2020-04-27 | End: 1900-01-01

## 2024-05-30 RX ORDER — CLOPIDOGREL BISULFATE 75 MG/1
75 TABLET, FILM COATED ORAL DAILY
Qty: 90 | Refills: 1 | Status: ACTIVE | COMMUNITY
Start: 2020-04-27 | End: 1900-01-01

## 2024-05-30 RX ORDER — METOPROLOL SUCCINATE 25 MG/1
25 TABLET, EXTENDED RELEASE ORAL DAILY
Qty: 90 | Refills: 3 | Status: ACTIVE | COMMUNITY
Start: 2023-07-20 | End: 1900-01-01

## 2024-05-30 RX ORDER — ATORVASTATIN CALCIUM 80 MG/1
80 TABLET, FILM COATED ORAL
Qty: 90 | Refills: 3 | Status: ACTIVE | COMMUNITY
Start: 2020-09-03 | End: 1900-01-01

## 2024-05-30 RX ORDER — LISINOPRIL 40 MG/1
40 TABLET ORAL
Qty: 90 | Refills: 3 | Status: ACTIVE | COMMUNITY
Start: 2020-11-23 | End: 1900-01-01

## 2024-05-30 NOTE — HISTORY OF PRESENT ILLNESS
[FreeTextEntry1] : 69 y/o male with h/o inferopost MI s/p TNK, s/p PCI RCA , cad s/p PCI pLAD , obesity, htn, hl, family h/o early cvd, s/p PCI NAKITA prox/mid RCA 10/23 who presents for f/up today   last seen  sent for cath with NAKITA to prox/mid RCA 10/23 started on plavix  has been off norvasc for a few weeks - ran out of meds   -CTA cor 10/23:  Cardiac: 1.  Moderate stenosis in the proximal RCA, followed by an area of moderate to severe in the mid RCA. 2.  Calcific plaque at the ostium of the large branching D1 where significant stenosis cannot be excluded. 3.  Patent stent in the mid LAD. 4.  Less than 50% stenosis of the Left Main. 5.  Nonobstructive in the remaining portions (as above).  FFR LAD: Not modelled due to presence of metallic stent. LCx: 0.76 mid  (2) RCA: 0.96 proximal (1); 0.82 mid (1)  Non-cardiac: The visualized aspects of the lungs are unremarkable  -Cath 10/23: severe prox/mid RCA 90-95% with concerning area thrombus in aneurysmal segment mid RCA, drca/rpd/pl diffuse 20-30%, mod IFR neg prox LAD 30% ISR in previous stent, mid LAD bridging, s/p PCI NAKITA prox/mid RCA,   met w [facility]   -Echo : 1. Normal left ventricular cavity size. Left ventricular wall thickness is moderately increased. Left ventricular systolic function is normal with an ejection fraction of 66 % by Keene's method of disks. 2. There is mild (grade 1) left ventricular diastolic dysfunction. 3. Normal right ventricular cavity size and normal right ventricular wall thickness. 4. Normal atria. 5. Structurally normal mitral valve with normal leaflet excursion. Mild MR 6. Estimated pulmonary artery systolic pressure is 30 mmHg. 7. No pericardial effusion seen. 8. No echocardiographic evidence of pulmonary hypertension.  -Stress Test: 2019, no Ischemia, 5:56, 79%mphr      no cp, sob no syncope, lh, palpitations, edema, orthopnea, pnd notes fatigue     former patient of Dr. Aguilar - last seen     not exercising  diet not great  no mental health issues        Stress Test: 2019, no Ischemia, 5:56, 79%mphr  Echo: 2016, enlarged LA size, mild mitral regurgitation LVEF 55%.  Cardiac Cath:  99% RCA s/p PCI RCA  CTA 2019: LM prob significant >50%, prox/mid LAD severe, D1 moderate, prox LCx mild, OM1 mild, proxRCA mild, mid RCA stent, distal to RCA stent severe, dRCA severe, RPLV mild, mild thoracid ao athero, subendo perfusion multiple segments in RCA territory  Cath 2019: distal LM 30%, prox LAD 70% s/p NAKITA prox LAD, LCx normal, mid RCA 50%, ef 50% w inf wall HK, edp 12, no as/mr  Nuclear 2019: submax 82% mphr, perfusion c/w prior inflat MI w mod periinfarct ischemia, mild TID    PMH/PSH:  inferopost MI s/p TNK, RCA PCI   s/p PCI LAD   obesity  htn  hl  achilles tendinitis  cataract  spinal stenosis      ALL:  crestor - dark urine      MEDS:  asa 81 mg qd norvasc 10 mg qd lipitor 80 mg qhs zetia 10 mg qhs lisinopril 40 mg qd toproxl xl 25 mg qd plavix 75 mg qd       SH:  no tobacco  social etoh  no drugs    [place]    2 children - 20's - healthy      FH:  mother -  , MI 60's, htn  father -  , MI 50's  sister - alive, healthy, 70  4 - 1/2 sister (1  lung cancer)

## 2024-05-30 NOTE — DISCUSSION/SUMMARY
[Patient] : the patient [EKG obtained to assist in diagnosis and management of assessed problem(s)] : EKG obtained to assist in diagnosis and management of assessed problem(s) [___ Month(s)] : in [unfilled] month(s) [FreeTextEntry1] : 67 y/o male with h/o inferopost MI s/p TNK, s/p PCI RCA 2003, cad s/p PCI pLAD 2019, obesity, htn, hl, family h/o early cvd, s/p PCI NAKITA prox/mid RCA 10/23 who presents for f/up today   -CTA cor 10/23:  Cardiac: 1.  Moderate stenosis in the proximal RCA, followed by an area of moderate to severe in the mid RCA. 2.  Calcific plaque at the ostium of the large branching D1 where significant stenosis cannot be excluded. 3.  Patent stent in the mid LAD. 4.  Less than 50% stenosis of the Left Main. 5.  Nonobstructive in the remaining portions (as above).  FFR LAD: Not modelled due to presence of metallic stent. LCx: 0.76 mid  (2) RCA: 0.96 proximal (1); 0.82 mid (1)  Non-cardiac: The visualized aspects of the lungs are unremarkable  -Cath 10/23: severe prox/mid RCA 90-95% with concerning area thrombus in aneurysmal segment mid RCA, drca/rpd/pl diffuse 20-30%, mod IFR neg prox LAD 30% ISR in previous stent, mid LAD bridging, s/p PCI NAKITA prox/mid RCA,   -Echo 8/23: 1. Normal left ventricular cavity size. Left ventricular wall thickness is moderately increased. Left ventricular systolic function is normal with an ejection fraction of 66 % by Keene's method of disks. 2. There is mild (grade 1) left ventricular diastolic dysfunction. 3. Normal right ventricular cavity size and normal right ventricular wall thickness. 4. Normal atria. 5. Structurally normal mitral valve with normal leaflet excursion. Mild MR 6. Estimated pulmonary artery systolic pressure is 30 mmHg. 7. No pericardial effusion seen. 8. No echocardiographic evidence of pulmonary hypertension.  -Stress Test: 11/2019, no Ischemia, 5:56, 79%mphr  -Echo: 2016, enlarged LA size, mild mitral regurgitation LVEF 55%.  -Cardiac Cath: 2003 99% RCA s/p PCI RCA  -CTA 2019: LM prob significant >50%, prox/mid LAD severe, D1 moderate, prox LCx mild, OM1 mild, proxRCA mild, mid RCA stent, distal to RCA stent severe, dRCA severe, RPLV mild, mild thoracid ao athero, subendo perfusion multiple segments in RCA territory  -Cath 2019: distal LM 30%, prox LAD 70% s/p NAKITA prox LAD, LCx normal, mid RCA 50%, ef 50% w inf wall HK, edp 12, no as/mr  -Nuclear 2019: submax 82% mphr, perfusion c/w prior inflat MI w mod periinfarct ischemia, mild TID  -ekg 7/23 - nsr, old infapical MI, nsra  -ekg ordered today - nsr, infapical MI, nsra  -labs 2023 reviewed, lipids, LpA ordered   -continue asa, plavix, statin, zetia  -continue toprol, ccb, ACE  -f/up with rodrigo for weight loss drug discussion  -counseled on cvd risk factors  -consider sleep apnea eval  -f/up 2 month for htn, cad    I have spent 30 minutes reviewing labs, records, tests and discussed cvd risk fators, cad, htn, hl.

## 2024-06-01 LAB
CHOLEST SERPL-MCNC: 106 MG/DL
HDLC SERPL-MCNC: 40 MG/DL
LDLC SERPL CALC-MCNC: 50 MG/DL
NONHDLC SERPL-MCNC: 66 MG/DL
TRIGL SERPL-MCNC: 75 MG/DL

## 2024-06-03 LAB — APO LP(A) SERPL-MCNC: 58.2 NMOL/L

## 2024-08-14 ENCOUNTER — NON-APPOINTMENT (OUTPATIENT)
Age: 69
End: 2024-08-14

## 2024-08-14 ENCOUNTER — APPOINTMENT (OUTPATIENT)
Dept: HEART AND VASCULAR | Facility: CLINIC | Age: 69
End: 2024-08-14
Payer: COMMERCIAL

## 2024-08-14 VITALS
DIASTOLIC BLOOD PRESSURE: 88 MMHG | OXYGEN SATURATION: 96 % | TEMPERATURE: 98.8 F | HEIGHT: 70 IN | SYSTOLIC BLOOD PRESSURE: 143 MMHG | HEART RATE: 67 BPM | BODY MASS INDEX: 35.65 KG/M2 | WEIGHT: 249 LBS

## 2024-08-14 VITALS — DIASTOLIC BLOOD PRESSURE: 77 MMHG | SYSTOLIC BLOOD PRESSURE: 156 MMHG

## 2024-08-14 PROCEDURE — 36415 COLL VENOUS BLD VENIPUNCTURE: CPT

## 2024-08-14 PROCEDURE — 93000 ELECTROCARDIOGRAM COMPLETE: CPT

## 2024-08-14 PROCEDURE — 99214 OFFICE O/P EST MOD 30 MIN: CPT | Mod: 25

## 2024-08-14 RX ORDER — HYDROCHLOROTHIAZIDE 12.5 MG/1
12.5 TABLET ORAL
Qty: 90 | Refills: 1 | Status: DISCONTINUED | COMMUNITY
Start: 2024-08-14 | End: 2024-08-14

## 2024-08-14 NOTE — HISTORY OF PRESENT ILLNESS
[FreeTextEntry1] : 68 y/o male with h/o inferopost MI s/p TNK, s/p PCI RCA , cad s/p PCI pLAD , obesity, htn, hl, family h/o early cvd, s/p PCI NAKITA prox/mid RCA 10/23 who presents for f/up today   last seen     no cp, sob no syncope, lh, palpitations, edema, orthopnea, pnd    sent for cath with NAKITA to prox/mid RCA 10/23      -CTA cor 10/23: Cardiac: 1. Moderate stenosis in the proximal RCA, followed by an area of moderate to severe in the mid RCA. 2. Calcific plaque at the ostium of the large branching D1 where significant stenosis cannot be excluded. 3. Patent stent in the mid LAD. 4. Less than 50% stenosis of the Left Main. 5. Nonobstructive in the remaining portions (as above).  FFR LAD: Not modelled due to presence of metallic stent. LCx: 0.76 mid (2) RCA: 0.96 proximal (1); 0.82 mid (1)  Non-cardiac: The visualized aspects of the lungs are unremarkable  -Cath 10/23: severe prox/mid RCA 90-95% with concerning area thrombus in aneurysmal segment mid RCA, drca/rpd/pl diffuse 20-30%, mod IFR neg prox LAD 30% ISR in previous stent, mid LAD bridging, s/p PCI NAKITA prox/mid RCA,  met w [facility] -Echo : 1. Normal left ventricular cavity size. Left ventricular wall thickness is moderately increased. Left ventricular systolic function is normal with an ejection fraction of 66 % by Keene's method of disks. 2. There is mild (grade 1) left ventricular diastolic dysfunction. 3. Normal right ventricular cavity size and normal right ventricular wall thickness. 4. Normal atria. 5. Structurally normal mitral valve with normal leaflet excursion. Mild MR 6. Estimated pulmonary artery systolic pressure is 30 mmHg. 7. No pericardial effusion seen. 8. No echocardiographic evidence of pulmonary hypertension.  -Stress Test: 2019, no Ischemia, 5:56, 79%mphr         former patient of Dr. Aguilar - last seen     not exercising  diet not great  no mental health issues        Stress Test: 2019, no Ischemia, 5:56, 79%mphr  Echo: 2016, enlarged LA size, mild mitral regurgitation LVEF 55%.  Cardiac Cath:  99% RCA s/p PCI RCA  CTA 2019: LM prob significant >50%, prox/mid LAD severe, D1 moderate, prox LCx mild, OM1 mild, proxRCA mild, mid RCA stent, distal to RCA stent severe, dRCA severe, RPLV mild, mild thoracid ao athero, subendo perfusion multiple segments in RCA territory  Cath 2019: distal LM 30%, prox LAD 70% s/p NAKITA prox LAD, LCx normal, mid RCA 50%, ef 50% w inf wall HK, edp 12, no as/mr  Nuclear 2019: submax 82% mphr, perfusion c/w prior inflat MI w mod periinfarct ischemia, mild TID    PMH/PSH:  inferopost MI s/p TNK, RCA PCI   s/p PCI LAD 2019  obesity  htn  hl  achilles tendinitis  cataract  spinal stenosis      ALL:  crestor - dark urine      MEDS:  asa 81 mg qd norvasc 10 mg qd lipitor 80 mg qhs zetia 10 mg qhs lisinopril 40 mg qd toproxl xl 25 mg qd plavix 75 mg qd       SH:  no tobacco  social etoh  no drugs    [place]    2 children - 20's - healthy      FH:  mother -  , MI 60's, htn  father -  , MI 50's  sister - alive, healthy, 70  4 - 1/2 sister (1  lung cancer)

## 2024-08-14 NOTE — DISCUSSION/SUMMARY
[Patient] : the patient [EKG obtained to assist in diagnosis and management of assessed problem(s)] : EKG obtained to assist in diagnosis and management of assessed problem(s) [___ Month(s)] : in [unfilled] month(s) [FreeTextEntry1] : 68 y/o male with h/o inferopost MI s/p TNK, s/p PCI RCA 2003, cad s/p PCI pLAD 2019, obesity, htn, hl, family h/o early cvd, s/p PCI NAKITA prox/mid RCA 10/23 who presents for f/up today   -CTA cor 10/23: Cardiac: 1. Moderate stenosis in the proximal RCA, followed by an area of moderate to severe in the mid RCA. 2. Calcific plaque at the ostium of the large branching D1 where significant stenosis cannot be excluded. 3. Patent stent in the mid LAD. 4. Less than 50% stenosis of the Left Main. 5. Nonobstructive in the remaining portions (as above).  FFR LAD: Not modelled due to presence of metallic stent. LCx: 0.76 mid (2) RCA: 0.96 proximal (1); 0.82 mid (1)  Non-cardiac: The visualized aspects of the lungs are unremarkable  -Cath 10/23: severe prox/mid RCA 90-95% with concerning area thrombus in aneurysmal segment mid RCA, drca/rpd/pl diffuse 20-30%, mod IFR neg prox LAD 30% ISR in previous stent, mid LAD bridging, s/p PCI NAKITA prox/mid RCA,  -Echo 8/23: 1. Normal left ventricular cavity size. Left ventricular wall thickness is moderately increased. Left ventricular systolic function is normal with an ejection fraction of 66 % by Keene's method of disks. 2. There is mild (grade 1) left ventricular diastolic dysfunction. 3. Normal right ventricular cavity size and normal right ventricular wall thickness. 4. Normal atria. 5. Structurally normal mitral valve with normal leaflet excursion. Mild MR 6. Estimated pulmonary artery systolic pressure is 30 mmHg. 7. No pericardial effusion seen. 8. No echocardiographic evidence of pulmonary hypertension.  -Stress Test: 11/2019, no Ischemia, 5:56, 79%mphr  -Echo: 2016, enlarged LA size, mild mitral regurgitation LVEF 55%.  -Cardiac Cath: 2003 99% RCA s/p PCI RCA  -CTA 2019: LM prob significant >50%, prox/mid LAD severe, D1 moderate, prox LCx mild, OM1 mild, proxRCA mild, mid RCA stent, distal to RCA stent severe, dRCA severe, RPLV mild, mild thoracid ao athero, subendo perfusion multiple segments in RCA territory  -Cath 2019: distal LM 30%, prox LAD 70% s/p NAKITA prox LAD, LCx normal, mid RCA 50%, ef 50% w inf wall HK, edp 12, no as/mr  -Nuclear 2019: submax 82% mphr, perfusion c/w prior inflat MI w mod periinfarct ischemia, mild TID  -ekg 7/23 - nsr, old infapical MI, nsra   -ekg ordered today - nsr, rvcd, inf mi orion  -labs 2023 reviewed, labs ordered today  -LpA normal  -continue asa, plavix, statin, zetia  -continue toprol, ccb, ACE    -f/up with rodrigo for weight loss drug discussion - at this point notes cost too high  -counseled on cvd risk factors  -consider sleep apnea eval  -f/up 5 month for htn, cad  I have spent 30 minutes reviewing labs, records, tests and discussed cvd risk fators, cad, htn, hl.

## 2024-08-15 LAB
ALBUMIN SERPL ELPH-MCNC: 4.5 G/DL
ALP BLD-CCNC: 64 U/L
ALT SERPL-CCNC: 34 U/L
ANION GAP SERPL CALC-SCNC: 13 MMOL/L
AST SERPL-CCNC: 21 U/L
BASOPHILS # BLD AUTO: 0.04 K/UL
BASOPHILS NFR BLD AUTO: 0.8 %
BILIRUB SERPL-MCNC: 0.8 MG/DL
BUN SERPL-MCNC: 17 MG/DL
CALCIUM SERPL-MCNC: 9.5 MG/DL
CHLORIDE SERPL-SCNC: 103 MMOL/L
CHOLEST SERPL-MCNC: 154 MG/DL
CO2 SERPL-SCNC: 24 MMOL/L
CREAT SERPL-MCNC: 0.99 MG/DL
CREAT SPEC-SCNC: 235 MG/DL
EGFR: 83 ML/MIN/1.73M2
EOSINOPHIL # BLD AUTO: 0.05 K/UL
EOSINOPHIL NFR BLD AUTO: 1 %
ESTIMATED AVERAGE GLUCOSE: 114 MG/DL
GLUCOSE SERPL-MCNC: 100 MG/DL
HBA1C MFR BLD HPLC: 5.6 %
HCT VFR BLD CALC: 41.3 %
HDLC SERPL-MCNC: 47 MG/DL
HGB BLD-MCNC: 13.6 G/DL
IMM GRANULOCYTES NFR BLD AUTO: 0.2 %
LDLC SERPL CALC-MCNC: 80 MG/DL
LYMPHOCYTES # BLD AUTO: 1.32 K/UL
LYMPHOCYTES NFR BLD AUTO: 25.1 %
MAGNESIUM SERPL-MCNC: 2.1 MG/DL
MAN DIFF?: NORMAL
MCHC RBC-ENTMCNC: 31.1 PG
MCHC RBC-ENTMCNC: 32.9 GM/DL
MCV RBC AUTO: 94.5 FL
MICROALBUMIN 24H UR DL<=1MG/L-MCNC: 4.7 MG/DL
MICROALBUMIN/CREAT 24H UR-RTO: 20 MG/G
MONOCYTES # BLD AUTO: 0.52 K/UL
MONOCYTES NFR BLD AUTO: 9.9 %
NEUTROPHILS # BLD AUTO: 3.32 K/UL
NEUTROPHILS NFR BLD AUTO: 63 %
NONHDLC SERPL-MCNC: 107 MG/DL
PLATELET # BLD AUTO: 168 K/UL
POTASSIUM SERPL-SCNC: 4.5 MMOL/L
PROT SERPL-MCNC: 6.8 G/DL
RBC # BLD: 4.37 M/UL
RBC # FLD: 13.2 %
SODIUM SERPL-SCNC: 140 MMOL/L
TRIGL SERPL-MCNC: 157 MG/DL
TSH SERPL-ACNC: 3.5 UIU/ML
WBC # FLD AUTO: 5.26 K/UL

## 2024-08-27 ENCOUNTER — RX RENEWAL (OUTPATIENT)
Age: 69
End: 2024-08-27

## 2024-12-18 NOTE — H&P ADULT - BIRTH SEX
Specialty Pharmacy Refill Coordination Note     Siena is a 46 y.o. male contacted today regarding refills of  3 specialty medication(s).    Reviewed and verified with patient:       Specialty medication(s) and dose(s) confirmed: yes    Refill Questions      Flowsheet Row Most Recent Value   Changes to allergies? No   Changes to medications? No   New conditions or infections since last clinic visit No   Unplanned office visit, urgent care, ED, or hospital admission in the last 4 weeks  No   How does patient/caregiver feel medication is working? Good   Financial problems or insurance changes  No   Since the previous refill, were any specialty medication doses or scheduled injections missed or delayed?  No   Does this patient require a clinical escalation to a pharmacist? No            Delivery Questions      Flowsheet Row Most Recent Value   Delivery method  at Pharmacy   Number of medications in delivery 3   Medication(s) being filled and delivered Lisinopril (PRINIVIL,ZESTRIL), metFORMIN HCl (GLUCOPHAGE-XR), Rosuvastatin Calcium (CRESTOR)   Doses left of specialty medications 1 week   Copay verified? Yes   Copay amount $9.20   Copay form of payment Pay at pickup   Signature Required No                   Follow-up: 23 day(s)     Rama Jaeger, Pharmacy Technician  Specialty Pharmacy Technician         Male

## 2024-12-25 PROBLEM — F10.90 ALCOHOL USE: Status: ACTIVE | Noted: 2019-02-20

## 2025-01-09 ENCOUNTER — NON-APPOINTMENT (OUTPATIENT)
Age: 70
End: 2025-01-09

## 2025-01-09 ENCOUNTER — RX RENEWAL (OUTPATIENT)
Age: 70
End: 2025-01-09

## 2025-01-09 RX ORDER — ASPIRIN 81 MG/1
81 TABLET, COATED ORAL
Qty: 90 | Refills: 3 | Status: ACTIVE | COMMUNITY
Start: 2025-01-09 | End: 1900-01-01

## 2025-01-15 ENCOUNTER — APPOINTMENT (OUTPATIENT)
Dept: HEART AND VASCULAR | Facility: CLINIC | Age: 70
End: 2025-01-15
Payer: COMMERCIAL

## 2025-01-15 VITALS
TEMPERATURE: 97.8 F | SYSTOLIC BLOOD PRESSURE: 129 MMHG | OXYGEN SATURATION: 99 % | HEIGHT: 70 IN | HEART RATE: 55 BPM | DIASTOLIC BLOOD PRESSURE: 67 MMHG

## 2025-01-15 VITALS — BODY MASS INDEX: 36.3 KG/M2 | WEIGHT: 253 LBS

## 2025-01-15 PROCEDURE — 99214 OFFICE O/P EST MOD 30 MIN: CPT | Mod: 25

## 2025-01-15 PROCEDURE — 93000 ELECTROCARDIOGRAM COMPLETE: CPT

## 2025-01-15 RX ORDER — EVOLOCUMAB 140 MG/ML
140 INJECTION, SOLUTION SUBCUTANEOUS
Qty: 2 | Refills: 5 | Status: ACTIVE | COMMUNITY
Start: 2025-01-15 | End: 1900-01-01

## 2025-02-07 ENCOUNTER — APPOINTMENT (OUTPATIENT)
Dept: HEART AND VASCULAR | Facility: CLINIC | Age: 70
End: 2025-02-07
Payer: COMMERCIAL

## 2025-02-07 DIAGNOSIS — E88.810 METABOLIC SYNDROME: ICD-10-CM

## 2025-02-07 DIAGNOSIS — I25.10 ATHEROSCLEROTIC HEART DISEASE OF NATIVE CORONARY ARTERY W/OUT ANGINA PECTORIS: ICD-10-CM

## 2025-02-07 PROCEDURE — G2211 COMPLEX E/M VISIT ADD ON: CPT | Mod: NC,93

## 2025-02-07 PROCEDURE — 99203 OFFICE O/P NEW LOW 30 MIN: CPT | Mod: 93

## 2025-02-26 RX ORDER — SEMAGLUTIDE 0.25 MG/.5ML
0.25 INJECTION, SOLUTION SUBCUTANEOUS
Qty: 1 | Refills: 3 | Status: ACTIVE | COMMUNITY
Start: 2025-02-26 | End: 1900-01-01

## 2025-04-22 ENCOUNTER — NON-APPOINTMENT (OUTPATIENT)
Age: 70
End: 2025-04-22

## 2025-04-22 ENCOUNTER — APPOINTMENT (OUTPATIENT)
Dept: HEART AND VASCULAR | Facility: CLINIC | Age: 70
End: 2025-04-22
Payer: COMMERCIAL

## 2025-04-22 VITALS
HEART RATE: 66 BPM | SYSTOLIC BLOOD PRESSURE: 138 MMHG | OXYGEN SATURATION: 97 % | WEIGHT: 247 LBS | DIASTOLIC BLOOD PRESSURE: 78 MMHG | TEMPERATURE: 98 F | BODY MASS INDEX: 35.36 KG/M2 | HEIGHT: 70 IN

## 2025-04-22 VITALS — DIASTOLIC BLOOD PRESSURE: 60 MMHG | SYSTOLIC BLOOD PRESSURE: 105 MMHG

## 2025-04-22 PROCEDURE — 99214 OFFICE O/P EST MOD 30 MIN: CPT

## 2025-06-10 ENCOUNTER — APPOINTMENT (OUTPATIENT)
Dept: HEART AND VASCULAR | Facility: CLINIC | Age: 70
End: 2025-06-10
Payer: COMMERCIAL

## 2025-06-10 VITALS
BODY MASS INDEX: 34.93 KG/M2 | OXYGEN SATURATION: 98 % | HEART RATE: 66 BPM | SYSTOLIC BLOOD PRESSURE: 120 MMHG | WEIGHT: 244 LBS | TEMPERATURE: 97.8 F | DIASTOLIC BLOOD PRESSURE: 75 MMHG | HEIGHT: 70 IN

## 2025-06-10 PROCEDURE — 99214 OFFICE O/P EST MOD 30 MIN: CPT

## 2025-07-02 ENCOUNTER — RX RENEWAL (OUTPATIENT)
Age: 70
End: 2025-07-02

## 2025-07-05 ENCOUNTER — NON-APPOINTMENT (OUTPATIENT)
Age: 70
End: 2025-07-05

## 2025-08-27 ENCOUNTER — NON-APPOINTMENT (OUTPATIENT)
Age: 70
End: 2025-08-27